# Patient Record
Sex: MALE | Race: WHITE | NOT HISPANIC OR LATINO | Employment: PART TIME | URBAN - NONMETROPOLITAN AREA
[De-identification: names, ages, dates, MRNs, and addresses within clinical notes are randomized per-mention and may not be internally consistent; named-entity substitution may affect disease eponyms.]

---

## 2023-11-09 DIAGNOSIS — E11.9 TYPE 2 DIABETES MELLITUS TREATED WITHOUT INSULIN (MULTI): Primary | ICD-10-CM

## 2023-11-12 RX ORDER — BLOOD-GLUCOSE TRANSMITTER
EACH MISCELLANEOUS
Qty: 1 EACH | Refills: 2 | Status: SHIPPED | OUTPATIENT
Start: 2023-11-12

## 2023-11-27 PROBLEM — I10 HIGH BLOOD PRESSURE: Status: ACTIVE | Noted: 2023-11-27

## 2023-11-27 PROBLEM — N52.9 ERECTILE DYSFUNCTION: Status: ACTIVE | Noted: 2023-11-27

## 2023-11-27 PROBLEM — H53.2 DIPLOPIA: Status: ACTIVE | Noted: 2023-11-27

## 2023-11-27 PROBLEM — H33.20 RETINAL DETACHMENT: Status: ACTIVE | Noted: 2023-11-27

## 2023-11-27 PROBLEM — N52.9 IMPOTENCE OF ORGANIC ORIGIN: Status: ACTIVE | Noted: 2023-11-27

## 2023-11-27 PROBLEM — E11.9 TYPE II DIABETES MELLITUS (MULTI): Status: ACTIVE | Noted: 2023-11-27

## 2023-11-27 PROBLEM — E11.3591: Status: ACTIVE | Noted: 2023-11-27

## 2023-11-27 PROBLEM — E78.00 HIGH CHOLESTEROL: Status: ACTIVE | Noted: 2023-11-27

## 2023-11-27 PROBLEM — F32.A DEPRESSION: Status: ACTIVE | Noted: 2023-11-27

## 2023-12-30 DIAGNOSIS — E11.9 TYPE 2 DIABETES MELLITUS TREATED WITHOUT INSULIN (MULTI): ICD-10-CM

## 2023-12-30 RX ORDER — INSULIN GLARGINE 100 [IU]/ML
100 INJECTION, SOLUTION SUBCUTANEOUS NIGHTLY
Qty: 30 ML | Refills: 0 | Status: SHIPPED | OUTPATIENT
Start: 2023-12-30 | End: 2024-01-29

## 2023-12-30 NOTE — PROGRESS NOTES
I spoke with him today ON CALL. Out of Lantus for a few days. States pharmacies did not have it. Found the [facility] on [address] does have it. I sent it in for him but reminded him that he missed his appt at the end for last month. States the weather was bad but I reminded him he needs to call to cancel these if he can't make it.   He will call in next week to scheduled appt. Virtual is okay. He'll do the lab tests I entered at a  lab before that visit. Can download the dexcom at that visit

## 2024-01-25 DIAGNOSIS — E11.9 TYPE 2 DIABETES MELLITUS TREATED WITHOUT INSULIN (MULTI): ICD-10-CM

## 2024-01-29 ENCOUNTER — OFFICE VISIT (OUTPATIENT)
Dept: PRIMARY CARE | Facility: CLINIC | Age: 59
End: 2024-01-29
Payer: COMMERCIAL

## 2024-01-29 VITALS
WEIGHT: 200 LBS | DIASTOLIC BLOOD PRESSURE: 70 MMHG | OXYGEN SATURATION: 89 % | HEART RATE: 64 BPM | SYSTOLIC BLOOD PRESSURE: 152 MMHG | BODY MASS INDEX: 30.41 KG/M2

## 2024-01-29 DIAGNOSIS — E11.3591: ICD-10-CM

## 2024-01-29 DIAGNOSIS — I10 PRIMARY HYPERTENSION: ICD-10-CM

## 2024-01-29 DIAGNOSIS — F32.89 OTHER DEPRESSION: ICD-10-CM

## 2024-01-29 DIAGNOSIS — Z79.4 TYPE 2 DIABETES MELLITUS WITH MILD NONPROLIFERATIVE RETINOPATHY WITHOUT MACULAR EDEMA, WITH LONG-TERM CURRENT USE OF INSULIN, UNSPECIFIED LATERALITY (MULTI): Primary | ICD-10-CM

## 2024-01-29 DIAGNOSIS — R09.02 HYPOXIA: ICD-10-CM

## 2024-01-29 DIAGNOSIS — Z12.11 ENCOUNTER FOR SCREENING FOR MALIGNANT NEOPLASM OF COLON: ICD-10-CM

## 2024-01-29 DIAGNOSIS — E11.3299 TYPE 2 DIABETES MELLITUS WITH MILD NONPROLIFERATIVE RETINOPATHY WITHOUT MACULAR EDEMA, WITH LONG-TERM CURRENT USE OF INSULIN, UNSPECIFIED LATERALITY (MULTI): Primary | ICD-10-CM

## 2024-01-29 PROBLEM — E11.319 TYPE 2 DIABETES MELLITUS WITH RETINOPATHY, WITH LONG-TERM CURRENT USE OF INSULIN (MULTI): Status: ACTIVE | Noted: 2023-11-27

## 2024-01-29 PROBLEM — E10.3523: Status: ACTIVE | Noted: 2018-07-17

## 2024-01-29 LAB — POC HEMOGLOBIN A1C: 5.4 % (ref 4.2–6.5)

## 2024-01-29 PROCEDURE — 3077F SYST BP >= 140 MM HG: CPT | Performed by: FAMILY MEDICINE

## 2024-01-29 PROCEDURE — 99214 OFFICE O/P EST MOD 30 MIN: CPT | Performed by: FAMILY MEDICINE

## 2024-01-29 PROCEDURE — 1036F TOBACCO NON-USER: CPT | Performed by: FAMILY MEDICINE

## 2024-01-29 PROCEDURE — 3078F DIAST BP <80 MM HG: CPT | Performed by: FAMILY MEDICINE

## 2024-01-29 RX ORDER — INSULIN GLARGINE 100 [IU]/ML
INJECTION, SOLUTION SUBCUTANEOUS
Qty: 30 ML | Refills: 0 | Status: SHIPPED | OUTPATIENT
Start: 2024-01-29 | End: 2024-01-30 | Stop reason: SDUPTHER

## 2024-01-29 ASSESSMENT — ENCOUNTER SYMPTOMS
PALPITATIONS: 0
NAUSEA: 0
OCCASIONAL FEELINGS OF UNSTEADINESS: 0
LOSS OF SENSATION IN FEET: 0
BLOOD IN STOOL: 0
DIARRHEA: 0
CHEST TIGHTNESS: 0
SHORTNESS OF BREATH: 0
ACTIVITY CHANGE: 0
HEADACHES: 0
DEPRESSION: 0
DIZZINESS: 0
CONSTIPATION: 0
COUGH: 0

## 2024-01-29 ASSESSMENT — PATIENT HEALTH QUESTIONNAIRE - PHQ9
1. LITTLE INTEREST OR PLEASURE IN DOING THINGS: NOT AT ALL
2. FEELING DOWN, DEPRESSED OR HOPELESS: NOT AT ALL
SUM OF ALL RESPONSES TO PHQ9 QUESTIONS 1 AND 2: 0

## 2024-01-29 ASSESSMENT — PAIN SCALES - GENERAL: PAINLEVEL: 0-NO PAIN

## 2024-01-29 NOTE — PATIENT INSTRUCTIONS
Your diabetes is very well controlled.  You blood oxygen is low. Let's get a chest xray to work that up.   Do your blood tests.  You're doing fine off wellbutrin, so we will continue to hold it.  The cologuard test will come in the mail

## 2024-01-29 NOTE — PROGRESS NOTES
Subjective   Patient ID: Kevin L Bosworth is a 58 y.o. male who presents for Diabetes.    He presents today for regular follow-up.  His hemoglobin A1c is excellent today.  He attributes that to the CGM which allows him to see what is going on with his blood sugars in real-time and has been able to adjust his diet.  He even gets some low sugars at times but he has been adjusting his insulin his own to help with this.    His pulse ox today is low for some reason.  He states he continues to have this tickly cough that has had for many many years now.  Back in 2020 he had a chest x-ray and CT angiogram which were negative for any lung issues.  He states overall he feels well.  Is any chest pain or shortness of breath.  He does not follow his blood pressure at home.    He stopped the Wellbutrin.  He has not noticed a difference and also would like to just remain off of that.    He has not had a colonoscopy.  He has had no family history of colon cancer.  He does not have any chronic constipation or diarrhea no blood in the stool.  He is hesitant to do a colonoscopy.         Review of Systems   Constitutional:  Negative for activity change.   Respiratory:  Negative for cough, chest tightness and shortness of breath.    Cardiovascular:  Negative for chest pain, palpitations and leg swelling.   Gastrointestinal:  Negative for blood in stool, constipation, diarrhea and nausea.   Neurological:  Negative for dizziness and headaches.       Objective   /70   Pulse 64   Wt 90.7 kg (200 lb)   SpO2 (!) 89%   BMI 30.41 kg/m²     Physical Exam  Constitutional:       Appearance: Normal appearance.   Neck:      Thyroid: No thyromegaly or thyroid tenderness.      Vascular: No carotid bruit.   Cardiovascular:      Rate and Rhythm: Normal rate and regular rhythm.      Heart sounds: No murmur heard.  Pulmonary:      Effort: Pulmonary effort is normal.      Breath sounds: Normal breath sounds.   Musculoskeletal:      Cervical  back: Neck supple.   Neurological:      Mental Status: He is alert.   Psychiatric:         Mood and Affect: Mood normal.         Assessment/Plan   Diagnoses and all orders for this visit:  Type 2 diabetes mellitus with mild nonproliferative retinopathy without macular edema, with long-term current use of insulin, unspecified laterality (CMS/[facility])  -     POCT glycosylated hemoglobin (Hb A1C) manually resulted  -     Lipid Panel; Future  -     Comprehensive Metabolic Panel; Future  -     Albumin , Urine Random; Future  Hypoxia  -     XR chest 2 views; Future  -     CBC and Auto Differential; Future  Proliferative diabetic retinopathy of right eye determined by examination (CMS/[facility])  Primary hypertension  Other depression  Encounter for screening for malignant neoplasm of colon  -     Cologuard® colon cancer screening; Future  Diabetes is well-controlled.  He will continue his current medications.    This blood oxygen is quite low we and we recheck a number of times.  Start with a chest x-ray and then figure out workup from there.  Will also get a CBC.    He can continue to be off the Wellbutrin since it really has made a difference and he is feeling okay.    He is agreeable to Cologuard and then a colonoscopy should that be positive.

## 2024-01-30 DIAGNOSIS — E11.9 TYPE 2 DIABETES MELLITUS TREATED WITHOUT INSULIN (MULTI): ICD-10-CM

## 2024-01-30 RX ORDER — INSULIN GLARGINE 100 [IU]/ML
100 INJECTION, SOLUTION SUBCUTANEOUS NIGHTLY
Qty: 90 ML | Refills: 1 | Status: SHIPPED | OUTPATIENT
Start: 2024-01-30 | End: 2024-07-28

## 2024-03-06 ENCOUNTER — APPOINTMENT (OUTPATIENT)
Dept: RADIOLOGY | Facility: HOSPITAL | Age: 59
End: 2024-03-06
Payer: COMMERCIAL

## 2024-03-06 ENCOUNTER — APPOINTMENT (OUTPATIENT)
Dept: CARDIOLOGY | Facility: HOSPITAL | Age: 59
End: 2024-03-06
Payer: COMMERCIAL

## 2024-03-06 ENCOUNTER — HOSPITAL ENCOUNTER (OUTPATIENT)
Dept: CARDIOLOGY | Facility: HOSPITAL | Age: 59
Discharge: HOME | End: 2024-03-06
Payer: COMMERCIAL

## 2024-03-06 ENCOUNTER — HOSPITAL ENCOUNTER (OUTPATIENT)
Facility: HOSPITAL | Age: 59
Setting detail: OBSERVATION
Discharge: HOME | End: 2024-03-07
Attending: EMERGENCY MEDICINE | Admitting: INTERNAL MEDICINE
Payer: COMMERCIAL

## 2024-03-06 DIAGNOSIS — I10 PRIMARY HYPERTENSION: ICD-10-CM

## 2024-03-06 DIAGNOSIS — R07.9 CHEST PAIN, UNSPECIFIED TYPE: Primary | ICD-10-CM

## 2024-03-06 LAB
ALBUMIN SERPL-MCNC: 3.9 G/DL (ref 3.5–5)
ALP BLD-CCNC: 96 U/L (ref 35–125)
ALT SERPL-CCNC: 36 U/L (ref 5–40)
ANION GAP SERPL CALC-SCNC: 11 MMOL/L
AST SERPL-CCNC: 19 U/L (ref 5–40)
BASOPHILS # BLD AUTO: 0.05 X10*3/UL (ref 0–0.1)
BASOPHILS NFR BLD AUTO: 0.7 %
BILIRUB SERPL-MCNC: 0.2 MG/DL (ref 0.1–1.2)
BUN SERPL-MCNC: 14 MG/DL (ref 8–25)
CALCIUM SERPL-MCNC: 8.8 MG/DL (ref 8.5–10.4)
CHLORIDE SERPL-SCNC: 101 MMOL/L (ref 97–107)
CO2 SERPL-SCNC: 25 MMOL/L (ref 24–31)
CREAT SERPL-MCNC: 1 MG/DL (ref 0.4–1.6)
EGFRCR SERPLBLD CKD-EPI 2021: 87 ML/MIN/1.73M*2
EOSINOPHIL # BLD AUTO: 0.13 X10*3/UL (ref 0–0.7)
EOSINOPHIL NFR BLD AUTO: 1.7 %
ERYTHROCYTE [DISTWIDTH] IN BLOOD BY AUTOMATED COUNT: 12 % (ref 11.5–14.5)
GLUCOSE SERPL-MCNC: 399 MG/DL (ref 65–99)
HCT VFR BLD AUTO: 46.2 % (ref 41–52)
HGB BLD-MCNC: 15.2 G/DL (ref 13.5–17.5)
IMM GRANULOCYTES # BLD AUTO: 0.01 X10*3/UL (ref 0–0.7)
IMM GRANULOCYTES NFR BLD AUTO: 0.1 % (ref 0–0.9)
LYMPHOCYTES # BLD AUTO: 2.13 X10*3/UL (ref 1.2–4.8)
LYMPHOCYTES NFR BLD AUTO: 28.4 %
MAGNESIUM SERPL-MCNC: 1.9 MG/DL (ref 1.6–3.1)
MCH RBC QN AUTO: 30.3 PG (ref 26–34)
MCHC RBC AUTO-ENTMCNC: 32.9 G/DL (ref 32–36)
MCV RBC AUTO: 92 FL (ref 80–100)
MONOCYTES # BLD AUTO: 0.87 X10*3/UL (ref 0.1–1)
MONOCYTES NFR BLD AUTO: 11.6 %
NEUTROPHILS # BLD AUTO: 4.32 X10*3/UL (ref 1.2–7.7)
NEUTROPHILS NFR BLD AUTO: 57.5 %
NRBC BLD-RTO: 0 /100 WBCS (ref 0–0)
PLATELET # BLD AUTO: 229 X10*3/UL (ref 150–450)
POTASSIUM SERPL-SCNC: 4.2 MMOL/L (ref 3.4–5.1)
PROT SERPL-MCNC: 6.5 G/DL (ref 5.9–7.9)
RBC # BLD AUTO: 5.02 X10*6/UL (ref 4.5–5.9)
SODIUM SERPL-SCNC: 137 MMOL/L (ref 133–145)
TROPONIN T SERPL-MCNC: 12 NG/L
WBC # BLD AUTO: 7.5 X10*3/UL (ref 4.4–11.3)

## 2024-03-06 PROCEDURE — 99222 1ST HOSP IP/OBS MODERATE 55: CPT | Performed by: NURSE PRACTITIONER

## 2024-03-06 PROCEDURE — 80053 COMPREHEN METABOLIC PANEL: CPT | Performed by: EMERGENCY MEDICINE

## 2024-03-06 PROCEDURE — 71045 X-RAY EXAM CHEST 1 VIEW: CPT | Performed by: RADIOLOGY

## 2024-03-06 PROCEDURE — 36415 COLL VENOUS BLD VENIPUNCTURE: CPT | Performed by: EMERGENCY MEDICINE

## 2024-03-06 PROCEDURE — G0378 HOSPITAL OBSERVATION PER HR: HCPCS

## 2024-03-06 PROCEDURE — 85025 COMPLETE CBC W/AUTO DIFF WBC: CPT | Performed by: EMERGENCY MEDICINE

## 2024-03-06 PROCEDURE — 83735 ASSAY OF MAGNESIUM: CPT | Performed by: EMERGENCY MEDICINE

## 2024-03-06 PROCEDURE — 99285 EMERGENCY DEPT VISIT HI MDM: CPT | Mod: 25

## 2024-03-06 PROCEDURE — 93005 ELECTROCARDIOGRAM TRACING: CPT | Mod: 59

## 2024-03-06 PROCEDURE — 84484 ASSAY OF TROPONIN QUANT: CPT | Performed by: EMERGENCY MEDICINE

## 2024-03-06 PROCEDURE — 93005 ELECTROCARDIOGRAM TRACING: CPT

## 2024-03-06 PROCEDURE — 71045 X-RAY EXAM CHEST 1 VIEW: CPT

## 2024-03-06 RX ORDER — DEXTROSE 50 % IN WATER (D50W) INTRAVENOUS SYRINGE
25
Status: DISCONTINUED | OUTPATIENT
Start: 2024-03-06 | End: 2024-03-07 | Stop reason: HOSPADM

## 2024-03-06 RX ORDER — INSULIN LISPRO 100 [IU]/ML
0-5 INJECTION, SOLUTION INTRAVENOUS; SUBCUTANEOUS
Status: DISCONTINUED | OUTPATIENT
Start: 2024-03-07 | End: 2024-03-07 | Stop reason: HOSPADM

## 2024-03-06 RX ORDER — ONDANSETRON HYDROCHLORIDE 2 MG/ML
4 INJECTION, SOLUTION INTRAVENOUS EVERY 6 HOURS PRN
Status: DISCONTINUED | OUTPATIENT
Start: 2024-03-06 | End: 2024-03-07 | Stop reason: HOSPADM

## 2024-03-06 RX ORDER — ACETAMINOPHEN 650 MG/1
650 SUPPOSITORY RECTAL EVERY 4 HOURS PRN
Status: DISCONTINUED | OUTPATIENT
Start: 2024-03-06 | End: 2024-03-07 | Stop reason: HOSPADM

## 2024-03-06 RX ORDER — ROSUVASTATIN CALCIUM 10 MG/1
10 TABLET, COATED ORAL NIGHTLY
Status: DISCONTINUED | OUTPATIENT
Start: 2024-03-06 | End: 2024-03-07 | Stop reason: HOSPADM

## 2024-03-06 RX ORDER — ACETAMINOPHEN 325 MG/1
650 TABLET ORAL EVERY 4 HOURS PRN
Status: DISCONTINUED | OUTPATIENT
Start: 2024-03-06 | End: 2024-03-07 | Stop reason: HOSPADM

## 2024-03-06 RX ORDER — INSULIN GLARGINE 100 [IU]/ML
50 INJECTION, SOLUTION SUBCUTANEOUS 2 TIMES DAILY
Status: DISCONTINUED | OUTPATIENT
Start: 2024-03-07 | End: 2024-03-07 | Stop reason: HOSPADM

## 2024-03-06 RX ORDER — DEXTROSE MONOHYDRATE 100 MG/ML
0.3 INJECTION, SOLUTION INTRAVENOUS ONCE AS NEEDED
Status: DISCONTINUED | OUTPATIENT
Start: 2024-03-06 | End: 2024-03-07 | Stop reason: HOSPADM

## 2024-03-06 RX ORDER — NAPROXEN SODIUM 220 MG/1
81 TABLET, FILM COATED ORAL DAILY
Status: DISCONTINUED | OUTPATIENT
Start: 2024-03-07 | End: 2024-03-07 | Stop reason: HOSPADM

## 2024-03-06 RX ORDER — ACETAMINOPHEN 160 MG/5ML
650 SOLUTION ORAL EVERY 4 HOURS PRN
Status: DISCONTINUED | OUTPATIENT
Start: 2024-03-06 | End: 2024-03-07 | Stop reason: HOSPADM

## 2024-03-06 RX ORDER — NITROGLYCERIN 0.4 MG/1
0.4 TABLET SUBLINGUAL EVERY 5 MIN PRN
Status: DISCONTINUED | OUTPATIENT
Start: 2024-03-06 | End: 2024-03-07 | Stop reason: HOSPADM

## 2024-03-06 ASSESSMENT — PAIN - FUNCTIONAL ASSESSMENT: PAIN_FUNCTIONAL_ASSESSMENT: 0-10

## 2024-03-06 ASSESSMENT — PAIN SCALES - GENERAL: PAINLEVEL_OUTOF10: 0 - NO PAIN

## 2024-03-06 ASSESSMENT — COLUMBIA-SUICIDE SEVERITY RATING SCALE - C-SSRS
1. IN THE PAST MONTH, HAVE YOU WISHED YOU WERE DEAD OR WISHED YOU COULD GO TO SLEEP AND NOT WAKE UP?: NO
6. HAVE YOU EVER DONE ANYTHING, STARTED TO DO ANYTHING, OR PREPARED TO DO ANYTHING TO END YOUR LIFE?: NO
2. HAVE YOU ACTUALLY HAD ANY THOUGHTS OF KILLING YOURSELF?: NO

## 2024-03-06 ASSESSMENT — HEART SCORE
HISTORY: MODERATELY SUSPICIOUS
RISK FACTORS: 1-2 RISK FACTORS
AGE: 45-64
ECG: NON-SPECIFIC REPOLARIZATION DISTURBANCE
HEART SCORE: 4
TROPONIN: LESS THAN OR EQUAL TO NORMAL LIMIT

## 2024-03-06 ASSESSMENT — PAIN DESCRIPTION - LOCATION: LOCATION: CHEST

## 2024-03-06 ASSESSMENT — PAIN DESCRIPTION - PROGRESSION: CLINICAL_PROGRESSION: NOT CHANGED

## 2024-03-07 ENCOUNTER — APPOINTMENT (OUTPATIENT)
Dept: CARDIOLOGY | Facility: HOSPITAL | Age: 59
End: 2024-03-07
Payer: COMMERCIAL

## 2024-03-07 ENCOUNTER — PHARMACY VISIT (OUTPATIENT)
Dept: PHARMACY | Facility: CLINIC | Age: 59
End: 2024-03-07
Payer: MEDICAID

## 2024-03-07 VITALS
BODY MASS INDEX: 30.31 KG/M2 | OXYGEN SATURATION: 98 % | WEIGHT: 200 LBS | RESPIRATION RATE: 16 BRPM | DIASTOLIC BLOOD PRESSURE: 85 MMHG | HEART RATE: 69 BPM | HEIGHT: 68 IN | SYSTOLIC BLOOD PRESSURE: 139 MMHG | TEMPERATURE: 98.3 F

## 2024-03-07 LAB
ALBUMIN SERPL-MCNC: 4 G/DL (ref 3.5–5)
ALBUMIN SERPL-MCNC: NORMAL G/DL
ALP BLD-CCNC: 100 U/L (ref 35–125)
ALP BLD-CCNC: NORMAL U/L
ALT SERPL-CCNC: 36 U/L (ref 5–40)
ALT SERPL-CCNC: NORMAL U/L
ANION GAP SERPL CALC-SCNC: 15 MMOL/L
ANION GAP SERPL CALC-SCNC: NORMAL MMOL/L
AST SERPL-CCNC: 20 U/L (ref 5–40)
AST SERPL-CCNC: NORMAL U/L
BASOPHILS # BLD AUTO: 0.05 X10*3/UL (ref 0–0.1)
BASOPHILS NFR BLD AUTO: 0.7 %
BILIRUB SERPL-MCNC: 0.3 MG/DL (ref 0.1–1.2)
BILIRUB SERPL-MCNC: NORMAL MG/DL
BUN SERPL-MCNC: 12 MG/DL (ref 8–25)
BUN SERPL-MCNC: NORMAL MG/DL
CALCIUM SERPL-MCNC: 9.3 MG/DL (ref 8.5–10.4)
CALCIUM SERPL-MCNC: NORMAL MG/DL
CHLORIDE SERPL-SCNC: 102 MMOL/L (ref 97–107)
CHLORIDE SERPL-SCNC: NORMAL MMOL/L
CO2 SERPL-SCNC: 24 MMOL/L (ref 24–31)
CO2 SERPL-SCNC: NORMAL MMOL/L
CREAT SERPL-MCNC: 1.1 MG/DL (ref 0.4–1.6)
CREAT SERPL-MCNC: NORMAL MG/DL
EGFRCR SERPLBLD CKD-EPI 2021: 78 ML/MIN/1.73M*2
EGFRCR SERPLBLD CKD-EPI 2021: NORMAL ML/MIN/{1.73_M2}
EOSINOPHIL # BLD AUTO: 0.17 X10*3/UL (ref 0–0.7)
EOSINOPHIL NFR BLD AUTO: 2.3 %
ERYTHROCYTE [DISTWIDTH] IN BLOOD BY AUTOMATED COUNT: 12 % (ref 11.5–14.5)
ERYTHROCYTE [DISTWIDTH] IN BLOOD BY AUTOMATED COUNT: NORMAL %
EST. AVERAGE GLUCOSE BLD GHB EST-MCNC: NORMAL MG/DL
GLUCOSE SERPL-MCNC: 106 MG/DL (ref 65–99)
GLUCOSE SERPL-MCNC: NORMAL MG/DL
HBA1C MFR BLD: NORMAL %
HCT VFR BLD AUTO: 45.1 % (ref 41–52)
HCT VFR BLD AUTO: NORMAL %
HGB BLD-MCNC: 15.2 G/DL (ref 13.5–17.5)
HGB BLD-MCNC: NORMAL G/DL
HOLD SPECIMEN: NORMAL
IMM GRANULOCYTES # BLD AUTO: 0.02 X10*3/UL (ref 0–0.7)
IMM GRANULOCYTES NFR BLD AUTO: 0.3 % (ref 0–0.9)
LYMPHOCYTES # BLD AUTO: 2.05 X10*3/UL (ref 1.2–4.8)
LYMPHOCYTES NFR BLD AUTO: 28.3 %
MCH RBC QN AUTO: 30 PG (ref 26–34)
MCH RBC QN AUTO: NORMAL PG
MCHC RBC AUTO-ENTMCNC: 33.7 G/DL (ref 32–36)
MCHC RBC AUTO-ENTMCNC: NORMAL G/DL
MCV RBC AUTO: 89 FL (ref 80–100)
MCV RBC AUTO: NORMAL FL
MONOCYTES # BLD AUTO: 0.95 X10*3/UL (ref 0.1–1)
MONOCYTES NFR BLD AUTO: 13.1 %
NEUTROPHILS # BLD AUTO: 4 X10*3/UL (ref 1.2–7.7)
NEUTROPHILS NFR BLD AUTO: 55.3 %
NRBC BLD-RTO: 0 /100 WBCS (ref 0–0)
NRBC BLD-RTO: NORMAL /100{WBCS}
PLATELET # BLD AUTO: 244 X10*3/UL (ref 150–450)
PLATELET # BLD AUTO: NORMAL 10*3/UL
POTASSIUM SERPL-SCNC: 4.1 MMOL/L (ref 3.4–5.1)
POTASSIUM SERPL-SCNC: NORMAL MMOL/L
PROT SERPL-MCNC: 6.5 G/DL (ref 5.9–7.9)
PROT SERPL-MCNC: NORMAL G/DL
RBC # BLD AUTO: 5.06 X10*6/UL (ref 4.5–5.9)
RBC # BLD AUTO: NORMAL 10*6/UL
RBC MORPH BLD: NORMAL
SODIUM SERPL-SCNC: 141 MMOL/L (ref 133–145)
SODIUM SERPL-SCNC: NORMAL MMOL/L
TROPONIN T SERPL-MCNC: 19 NG/L
TROPONIN T SERPL-MCNC: NORMAL
TSH SERPL DL<=0.05 MIU/L-ACNC: NORMAL M[IU]/L
WBC # BLD AUTO: 7.2 X10*3/UL (ref 4.4–11.3)
WBC # BLD AUTO: NORMAL 10*3/UL

## 2024-03-07 PROCEDURE — 93005 ELECTROCARDIOGRAM TRACING: CPT

## 2024-03-07 PROCEDURE — 36415 COLL VENOUS BLD VENIPUNCTURE: CPT | Performed by: EMERGENCY MEDICINE

## 2024-03-07 PROCEDURE — 2500000002 HC RX 250 W HCPCS SELF ADMINISTERED DRUGS (ALT 637 FOR MEDICARE OP, ALT 636 FOR OP/ED): Performed by: NURSE PRACTITIONER

## 2024-03-07 PROCEDURE — 99232 SBSQ HOSP IP/OBS MODERATE 35: CPT | Performed by: NURSE PRACTITIONER

## 2024-03-07 PROCEDURE — 80053 COMPREHEN METABOLIC PANEL: CPT | Performed by: NURSE PRACTITIONER

## 2024-03-07 PROCEDURE — 2500000001 HC RX 250 WO HCPCS SELF ADMINISTERED DRUGS (ALT 637 FOR MEDICARE OP): Performed by: NURSE PRACTITIONER

## 2024-03-07 PROCEDURE — 85025 COMPLETE CBC W/AUTO DIFF WBC: CPT | Performed by: NURSE PRACTITIONER

## 2024-03-07 PROCEDURE — 83036 HEMOGLOBIN GLYCOSYLATED A1C: CPT | Performed by: NURSE PRACTITIONER

## 2024-03-07 PROCEDURE — 85027 COMPLETE CBC AUTOMATED: CPT | Mod: 59 | Performed by: NURSE PRACTITIONER

## 2024-03-07 PROCEDURE — 84443 ASSAY THYROID STIM HORMONE: CPT | Performed by: NURSE PRACTITIONER

## 2024-03-07 PROCEDURE — 84484 ASSAY OF TROPONIN QUANT: CPT | Performed by: EMERGENCY MEDICINE

## 2024-03-07 PROCEDURE — 93005 ELECTROCARDIOGRAM TRACING: CPT | Mod: 59

## 2024-03-07 PROCEDURE — RXMED WILLOW AMBULATORY MEDICATION CHARGE

## 2024-03-07 PROCEDURE — G0378 HOSPITAL OBSERVATION PER HR: HCPCS

## 2024-03-07 PROCEDURE — 36415 COLL VENOUS BLD VENIPUNCTURE: CPT | Performed by: NURSE PRACTITIONER

## 2024-03-07 PROCEDURE — 84075 ASSAY ALKALINE PHOSPHATASE: CPT | Performed by: NURSE PRACTITIONER

## 2024-03-07 RX ORDER — NAPROXEN SODIUM 220 MG/1
81 TABLET, FILM COATED ORAL DAILY
Qty: 30 TABLET | Refills: 0 | Status: SHIPPED | OUTPATIENT
Start: 2024-03-08 | End: 2024-04-09 | Stop reason: HOSPADM

## 2024-03-07 RX ORDER — IBUPROFEN 200 MG
800 TABLET ORAL EVERY 6 HOURS PRN
COMMUNITY
End: 2024-04-09 | Stop reason: HOSPADM

## 2024-03-07 RX ORDER — LOSARTAN POTASSIUM 25 MG/1
25 TABLET ORAL DAILY
Qty: 30 TABLET | Refills: 0 | Status: SHIPPED | OUTPATIENT
Start: 2024-03-08 | End: 2024-05-20 | Stop reason: SDUPTHER

## 2024-03-07 RX ORDER — LOSARTAN POTASSIUM 50 MG/1
25 TABLET ORAL DAILY
Status: DISCONTINUED | OUTPATIENT
Start: 2024-03-07 | End: 2024-03-07 | Stop reason: HOSPADM

## 2024-03-07 RX ADMIN — INSULIN GLARGINE 50 UNITS: 100 INJECTION, SOLUTION SUBCUTANEOUS at 09:23

## 2024-03-07 RX ADMIN — ASPIRIN 81 MG CHEWABLE TABLET 81 MG: 81 TABLET CHEWABLE at 09:16

## 2024-03-07 RX ADMIN — LOSARTAN POTASSIUM 25 MG: 50 TABLET, FILM COATED ORAL at 09:17

## 2024-03-07 ASSESSMENT — PAIN - FUNCTIONAL ASSESSMENT: PAIN_FUNCTIONAL_ASSESSMENT: 0-10

## 2024-03-07 NOTE — ED PROVIDER NOTES
EMERGENCY DEPARTMENT ENCOUNTER      Pt Name: Kevin L Bosworth  MRN: [id number]  Birthdate 1965  Date of evaluation: 3/6/2024  ED Provider: Katt Cornleius DO     CHIEF COMPLAINT       Chief Complaint   Patient presents with    Chest Pain     Center chest pain started an hour ago        HISTORY OF PRESENT ILLNESS    Kevin L Bosworth is a 58 y.o. who presents to the emergency department via EMS with a complaint of chest pain.  He has a history of longstanding type 1 diabetes that is well-controlled with a last hemoglobin A1c of 5.4.  He states that earlier today he had an episode of substernal chest pressure.  He rates it a 4-5 out of 10.  He was given 1 nitroglycerin by EMS that improved his symptoms to a 2 3.  He had some associated lightheadedness.  There is a family history of hypertension but no history of CAD.  He himself believes he has hypertension but has never been formally diagnosed and is on the medication for this.  No history of hyperlipidemia or CAD in himself.  He does not smoke.    Nursing Notes were reviewed.    Outside historians: Family  REVIEW OF SYSTEMS     Focused ROS performed and negative other than as listed in HPI    PAST MEDICAL HISTORY     Past Medical History:   Diagnosis Date    Personal history of other diseases of the circulatory system     History of hypertension    Personal history of other endocrine, nutritional and metabolic disease     History of diabetes mellitus    Personal history of other endocrine, nutritional and metabolic disease     History of diabetic retinopathy       SURGICAL HISTORY       Past Surgical History:   Procedure Laterality Date    APPENDECTOMY  07/14/2016    Appendectomy    CATARACT EXTRACTION  07/14/2016    Cataract Surgery    OTHER SURGICAL HISTORY  07/14/2016    Laser Suite Retina Laser Treatment       CURRENT MEDICATIONS       Previous Medications    BLOOD-GLUCOSE SENSOR (DEXCOM G6 SENSOR) DEVICE    APPLY NEW SENSOR ONCE EVERY 10 DAYS FOR CONTINUOUS  GLUCOSE MONITORING 30 DAYS    DEXCOM G4 PLATINUM TRANSMITTER (DEXCOM G6 TRANSMITTER) DEVICE    INSERT INTO SENSOR EVERY 10 DAYS REPLACE ONCE EVERY 90 DAYS 30 DAYS    GLUCAGON (GVOKE HYPOPEN 2-PACK) 1 MG/0.2 ML AUTO-INJECTOR    Inject as directed for severe low blood sugar. Repeat in 15 minutes if needed. Subcutaneous for 30 days    GLUCOSAMINE SULFATE 500 MG CAPSULE    Take by mouth.    INSULIN GLARGINE (LANTUS U-100 INSULIN) 100 UNIT/ML INJECTION    Inject 100 Units under the skin once daily at bedtime. Take as directed per insulin instructions.    INSULIN LISPRO (HUMALOG KWIKPEN INSULIN) 100 UNIT/ML INJECTION    20 UNITS SUBCUTANEOUS THREE TIMES A DAY WITH MEAL 90 DAYS    ROSUVASTATIN (CRESTOR) 10 MG TABLET    once every 24 hours.       ALLERGIES     Dye    FAMILY HISTORY     No family history on file.     SOCIAL HISTORY       Social History     Socioeconomic History    Marital status: Single     Spouse name: None    Number of children: None    Years of education: None    Highest education level: None   Occupational History    Occupation: Investigator   Tobacco Use    Smoking status: Never    Smokeless tobacco: Never   Vaping Use    Vaping Use: Never used   Substance and Sexual Activity    Alcohol use: Never    Drug use: Never    Sexual activity: None   Other Topics Concern    None   Social History Narrative    None     Social Determinants of Health     Financial Resource Strain: Not on file   Food Insecurity: Not on file   Transportation Needs: Not on file   Physical Activity: Not on file   Stress: Not on file   Social Connections: Not on file   Intimate Partner Violence: Not on file   Housing Stability: Not on file       PHYSICAL EXAM       ED Triage Vitals [03/06/24 2100]   Temperature Heart Rate Respirations BP   36.8 °C (98.3 °F) 78 18 169/83      Pulse Ox Temp Source Heart Rate Source Patient Position   96 % Oral -- --      BP Location FiO2 (%)     -- --        General: Appears well, no acute distress,  alert  Head: Head atraumatic; normocephalic  Eyes: normal inspection; no icterus  ENT: mucosa moist without lesion  Neck: Normal inspection, no meningeal signs  Resp: Normal breath sounds, no wheeze or crackles; No respiratory distress  Chest Wall: no tenderness or deformity  Heart: Heart rate and rhythm regular; No Murmurs  Abdomen: Soft, Non-tender; No distention, guarding, rigidity, or rebound  MSK: Normal appearance; Moves all extremities; No Pedal edema  Neuro: Alert; no focal deficits, moves all extremities  Psych: Mood and Affect normal  Skin: Color appropriate; warm; Dry    DIAGNOSTIC RESULTS   Lab and radiology results are independently interpreted unless noted below.  RADIOLOGY (Per Emergency Physician):     Interpretation per the Radiologist below, if available at the time of this note:  XR chest 1 view   Final Result   No acute cardiopulmonary process.        MACRO:   None        Signed by: Ashly Thao 3/6/2024 10:01 PM   Dictation workstation:   [id number]          LABS:  Abnormal Labs Reviewed   COMPREHENSIVE METABOLIC PANEL - Abnormal; Notable for the following components:       Result Value    Glucose 399 (*)     All other components within normal limits       All other labs were within normal range or not returned as of this dictation.    EKG:  Personally interpreted by Katt Cornelius DO  2100: Sinus rhythm with occasional PAC ventricular rate 81 normal axis and intervals T wave flattening in lateral leads no acute ischemic changes    EMERGENCY DEPARTMENT COURSE/MDM:   Patient presents with a complaint of chest pain that has resolved at the time of evaluation.  Will undergo a cardiac workup.  Aspirin was provided by EMS as well.    Lab workup returned showing no specific acute abnormalities.  First troponin is negative.  Case is discussed with the nurse practitioner on-call who accept the patient for admission.  Patient is agreeable with plan of admission for cardiology evaluation.  He is  admitted in stable condition.    Heart Score: HEART Score: 4      Diagnoses as of 03/06/24 2246   Chest pain, unspecified type     Meds Administered:  Medications   aspirin chewable tablet 81 mg (has no administration in time range)   acetaminophen (Tylenol) tablet 650 mg (has no administration in time range)     Or   acetaminophen (Tylenol) oral liquid 650 mg (has no administration in time range)     Or   acetaminophen (Tylenol) suppository 650 mg (has no administration in time range)   oxygen (O2) therapy (has no administration in time range)   ondansetron (Zofran) injection 4 mg (has no administration in time range)   nitroglycerin (Nitrostat) SL tablet 0.4 mg (has no administration in time range)   rosuvastatin (Crestor) tablet 10 mg (has no administration in time range)   insulin glargine (Lantus) injection 50 Units (has no administration in time range)   dextrose 50 % injection 25 g (has no administration in time range)   glucagon (Glucagen) injection 1 mg (has no administration in time range)   dextrose 10 % in water (D10W) infusion (has no administration in time range)   insulin lispro (HumaLOG) injection 0-5 Units (has no administration in time range)     PROCEDURES:  Unless otherwise noted below, none  Procedures      FINAL IMPRESSION      1. Chest pain, unspecified type          DISPOSITION    Admit 03/06/2024 09:59:01 PM        (Comment: Please note this report has been produced using speech recognition software and may contain errors related to that system including errors in grammar, punctuation, and spelling, as well as words and phrases that may be inappropriate.  If there are any questions or concerns please feel free to contact the dictating provider for clarification.)    Katt Cornelius DO (electronically signed)  Emergency Medicine Physician    History Limited by: None  Independent history obtained from: Family Member siser  External records reviewed: None  Diagnostics interpreted by me: EKG -  see my independent interpretation elsewhere in the chart  Discussions with other clinicians:  observation NP  Chronic conditions impacting care: Diabetes  Social determinants of health affecting care: None  Diagnostic tests considered but not performed: n/a  ED Medications managed:  Medications   aspirin chewable tablet 81 mg (has no administration in time range)   acetaminophen (Tylenol) tablet 650 mg (has no administration in time range)     Or   acetaminophen (Tylenol) oral liquid 650 mg (has no administration in time range)     Or   acetaminophen (Tylenol) suppository 650 mg (has no administration in time range)   oxygen (O2) therapy (has no administration in time range)   ondansetron (Zofran) injection 4 mg (has no administration in time range)   nitroglycerin (Nitrostat) SL tablet 0.4 mg (has no administration in time range)   rosuvastatin (Crestor) tablet 10 mg (has no administration in time range)   insulin glargine (Lantus) injection 50 Units (has no administration in time range)   dextrose 50 % injection 25 g (has no administration in time range)   glucagon (Glucagen) injection 1 mg (has no administration in time range)   dextrose 10 % in water (D10W) infusion (has no administration in time range)   insulin lispro (HumaLOG) injection 0-5 Units (has no administration in time range)       Prescription drugs considered: None             Katt Cornelius DO  03/06/24 4186

## 2024-03-07 NOTE — ED NOTES
Patient is requesting to take all meds/insulin once he is able to eat.   Dr. Farnsworth just put in diet order.        Shira Horner, STEFANIA  03/07/24 0871

## 2024-03-07 NOTE — H&P
History Of Present Illness  Kevin L Bosworth is a 58 y.o. male PMH: Primary HTN, Diabetes Type I, Diabetic retinopathy of rt eye, depression, presenting with chest pain. Patient endorses one episode of mid-sternal chest pain 4/10 with no radiation of symptoms that started today (03/06/24) around 6pm with resolution of symptoms after EMS arrival and Nitroglycerin administration by EMS. Endorses shortness of breath with episode. No diaphoresis, n/v, diarrhea, fever, or chills noted. Patient states that symptoms of chest pain have since resolved. VSS on RA. No known history of ACS or CAD. No history of PE. Patient endorses family hx of HTN. No recent hospitalizations, no recent illness, no known sick contacts. Patient reports an ongoing cough for past year, nonproductive.  PCP-  No Cardiologist     *Allergy to DYE, patient endorses last stress test had severe reaction. Unable to locate previous testing order*    *Patient self glucose monitor reading error above 400 prior to arrival to ED.    ED Course;  EKG on arrival:Sinus rhythm with occasional PAC ventricular rate 81 normal axis and intervals T wave flattening in lateral leads no acute ischemic changes    CXR:No acute cardiopulmonary process.   High Sensitivity Troponin's; 12,  Glucose: 399  Heart Score: 4    Past Medical History  Past Medical History:   Diagnosis Date    Personal history of other diseases of the circulatory system     History of hypertension    Personal history of other endocrine, nutritional and metabolic disease     History of diabetes mellitus    Personal history of other endocrine, nutritional and metabolic disease     History of diabetic retinopathy       Surgical History  Past Surgical History:   Procedure Laterality Date    APPENDECTOMY  07/14/2016    Appendectomy    CATARACT EXTRACTION  07/14/2016    Cataract Surgery    OTHER SURGICAL HISTORY  07/14/2016    Laser Suite Retina Laser Treatment        Social History  He reports that  "he has never smoked. He has never used smokeless tobacco. He reports that he does not drink alcohol and does not use drugs.    Family History  No family history on file.     Allergies  Dye    Review of Systems     Physical Exam  Vitals and nursing note reviewed.   Constitutional:       Appearance: Normal appearance.   HENT:      Head: Normocephalic and atraumatic.      Nose: Nose normal.      Mouth/Throat:      Mouth: Mucous membranes are moist.   Eyes:      Pupils: Pupils are equal, round, and reactive to light.   Cardiovascular:      Rate and Rhythm: Normal rate and regular rhythm.   Pulmonary:      Effort: Pulmonary effort is normal.   Abdominal:      General: Abdomen is flat.   Genitourinary:     Comments: deferred  Musculoskeletal:      Cervical back: Normal range of motion and neck supple.   Skin:     General: Skin is warm.      Capillary Refill: Capillary refill takes less than 2 seconds.   Neurological:      General: No focal deficit present.      Mental Status: He is alert and oriented to person, place, and time. Mental status is at baseline.   Psychiatric:         Mood and Affect: Mood normal.         Behavior: Behavior normal.         Thought Content: Thought content normal.         Judgment: Judgment normal.          Last Recorded Vitals  Blood pressure 169/83, pulse 78, temperature 36.8 °C (98.3 °F), temperature source Oral, resp. rate 18, height 1.727 m (5' 8\"), weight 90.7 kg (200 lb), SpO2 96 %.    Relevant Results  Results for orders placed or performed during the hospital encounter of 03/06/24 (from the past 24 hour(s))   CBC and Auto Differential   Result Value Ref Range    WBC 7.5 4.4 - 11.3 x10*3/uL    nRBC 0.0 0.0 - 0.0 /100 WBCs    RBC 5.02 4.50 - 5.90 x10*6/uL    Hemoglobin 15.2 13.5 - 17.5 g/dL    Hematocrit 46.2 41.0 - 52.0 %    MCV 92 80 - 100 fL    MCH 30.3 26.0 - 34.0 pg    MCHC 32.9 32.0 - 36.0 g/dL    RDW 12.0 11.5 - 14.5 %    Platelets 229 150 - 450 x10*3/uL    Neutrophils % 57.5 40.0 " - 80.0 %    Immature Granulocytes %, Automated 0.1 0.0 - 0.9 %    Lymphocytes % 28.4 13.0 - 44.0 %    Monocytes % 11.6 2.0 - 10.0 %    Eosinophils % 1.7 0.0 - 6.0 %    Basophils % 0.7 0.0 - 2.0 %    Neutrophils Absolute 4.32 1.20 - 7.70 x10*3/uL    Immature Granulocytes Absolute, Automated 0.01 0.00 - 0.70 x10*3/uL    Lymphocytes Absolute 2.13 1.20 - 4.80 x10*3/uL    Monocytes Absolute 0.87 0.10 - 1.00 x10*3/uL    Eosinophils Absolute 0.13 0.00 - 0.70 x10*3/uL    Basophils Absolute 0.05 0.00 - 0.10 x10*3/uL   Comprehensive Metabolic Panel   Result Value Ref Range    Glucose 399 (H) 65 - 99 mg/dL    Sodium 137 133 - 145 mmol/L    Potassium 4.2 3.4 - 5.1 mmol/L    Chloride 101 97 - 107 mmol/L    Bicarbonate 25 24 - 31 mmol/L    Urea Nitrogen 14 8 - 25 mg/dL    Creatinine 1.00 0.40 - 1.60 mg/dL    eGFR 87 >60 mL/min/1.73m*2    Calcium 8.8 8.5 - 10.4 mg/dL    Albumin 3.9 3.5 - 5.0 g/dL    Alkaline Phosphatase 96 35 - 125 U/L    Total Protein 6.5 5.9 - 7.9 g/dL    AST 19 5 - 40 U/L    Bilirubin, Total 0.2 0.1 - 1.2 mg/dL    ALT 36 5 - 40 U/L    Anion Gap 11 <=19 mmol/L   Magnesium   Result Value Ref Range    Magnesium 1.90 1.60 - 3.10 mg/dL   Serial Troponin, Initial ([facility])   Result Value Ref Range    Troponin T, High Sensitivity 12 <=14 ng/L        Assessment/Plan   Principal Problem:    Chest pain  Chest Pain  -Trend High Sensitivity Troponin's: 12,  -Telemetry  -EKG PRN  -O2 PRN  -Nitroglycerin PRN  -NPO at midnight    2. DM I  -Accuchecks per protocol  -SSI  -Home dosages:  On Lispro TID PRN SS  On Lantus 50U BID     Secondary Problems:    3. HTN  -Vitals q4h      4. DVT Prophylaxis  -SCDS     Overall Plan/Impression:  03/06/24 @ 2246 : Admit to CDU/OBS . Pending bed assignment. Plan-Trend High Sensitivity Troponin's, overnight Telemetry, and monitoring of glucose. NPO at midnight if further cardiac testing warranted in am for risk stratification.    I spent 40 minutes in the professional and overall care of this  patient.      Veronika Hair, APRN-CNP

## 2024-03-07 NOTE — PROGRESS NOTES
"Kevin L Bosworth is a 58 y.o. male on day 0 of admission presenting with Chest pain.    Subjective   Patient resting in bed. Denies any chest pain, SOB, GI upset, fevers, or chills overnight. His chest pain had resolved once he arrived in ED. He had been given nitroglycerin by EMS. States he is feeling good this AM. Discussed plan of care.       Objective     Physical Exam  Constitutional:       General: He is not in acute distress.     Appearance: Normal appearance. He is not toxic-appearing.   HENT:      Head: Normocephalic.      Nose: Nose normal.      Mouth/Throat:      Mouth: Mucous membranes are moist.   Eyes:      Extraocular Movements: Extraocular movements intact.   Cardiovascular:      Rate and Rhythm: Normal rate and regular rhythm.      Pulses: Normal pulses.      Heart sounds: Normal heart sounds. No murmur heard.     No friction rub. No gallop.   Pulmonary:      Effort: Pulmonary effort is normal. No respiratory distress.      Breath sounds: Normal breath sounds. No wheezing, rhonchi or rales.   Abdominal:      General: Abdomen is flat.      Palpations: Abdomen is soft. There is no mass.      Tenderness: There is no abdominal tenderness. There is no guarding.   Musculoskeletal:         General: No signs of injury. Normal range of motion.      Right lower leg: No edema.      Left lower leg: No edema.   Skin:     General: Skin is warm.      Capillary Refill: Capillary refill takes less than 2 seconds.      Findings: No erythema or rash.   Neurological:      General: No focal deficit present.      Mental Status: He is alert and oriented to person, place, and time.   Psychiatric:         Mood and Affect: Mood normal.         Behavior: Behavior normal.         Last Recorded Vitals  Blood pressure (!) 168/98, pulse 64, temperature 36.8 °C (98.3 °F), temperature source Oral, resp. rate 18, height 1.727 m (5' 8\"), weight 90.7 kg (200 lb), SpO2 95 %.  Intake/Output last 3 Shifts:  No intake/output data " recorded.    Relevant Results              Results for orders placed or performed during the hospital encounter of 03/06/24 (from the past 24 hour(s))   CBC and Auto Differential   Result Value Ref Range    WBC 7.5 4.4 - 11.3 x10*3/uL    nRBC 0.0 0.0 - 0.0 /100 WBCs    RBC 5.02 4.50 - 5.90 x10*6/uL    Hemoglobin 15.2 13.5 - 17.5 g/dL    Hematocrit 46.2 41.0 - 52.0 %    MCV 92 80 - 100 fL    MCH 30.3 26.0 - 34.0 pg    MCHC 32.9 32.0 - 36.0 g/dL    RDW 12.0 11.5 - 14.5 %    Platelets 229 150 - 450 x10*3/uL    Neutrophils % 57.5 40.0 - 80.0 %    Immature Granulocytes %, Automated 0.1 0.0 - 0.9 %    Lymphocytes % 28.4 13.0 - 44.0 %    Monocytes % 11.6 2.0 - 10.0 %    Eosinophils % 1.7 0.0 - 6.0 %    Basophils % 0.7 0.0 - 2.0 %    Neutrophils Absolute 4.32 1.20 - 7.70 x10*3/uL    Immature Granulocytes Absolute, Automated 0.01 0.00 - 0.70 x10*3/uL    Lymphocytes Absolute 2.13 1.20 - 4.80 x10*3/uL    Monocytes Absolute 0.87 0.10 - 1.00 x10*3/uL    Eosinophils Absolute 0.13 0.00 - 0.70 x10*3/uL    Basophils Absolute 0.05 0.00 - 0.10 x10*3/uL   Comprehensive Metabolic Panel   Result Value Ref Range    Glucose 399 (H) 65 - 99 mg/dL    Sodium 137 133 - 145 mmol/L    Potassium 4.2 3.4 - 5.1 mmol/L    Chloride 101 97 - 107 mmol/L    Bicarbonate 25 24 - 31 mmol/L    Urea Nitrogen 14 8 - 25 mg/dL    Creatinine 1.00 0.40 - 1.60 mg/dL    eGFR 87 >60 mL/min/1.73m*2    Calcium 8.8 8.5 - 10.4 mg/dL    Albumin 3.9 3.5 - 5.0 g/dL    Alkaline Phosphatase 96 35 - 125 U/L    Total Protein 6.5 5.9 - 7.9 g/dL    AST 19 5 - 40 U/L    Bilirubin, Total 0.2 0.1 - 1.2 mg/dL    ALT 36 5 - 40 U/L    Anion Gap 11 <=19 mmol/L   Magnesium   Result Value Ref Range    Magnesium 1.90 1.60 - 3.10 mg/dL   Serial Troponin, Initial ([facility])   Result Value Ref Range    Troponin T, High Sensitivity 12 <=14 ng/L   Serial Troponin, 2 Hour ([facility])   Result Value Ref Range    Troponin T, High Sensitivity 12 <=14 ng/L   CBC   Result Value Ref Range    WBC 4.1 (L) 4.4 -  11.3 x10*3/uL    nRBC 0.0 0.0 - 0.0 /100 WBCs    RBC 2.78 (L) 4.50 - 5.90 x10*6/uL    Hemoglobin 8.4 (L) 13.5 - 17.5 g/dL    Hematocrit 27.3 (L) 41.0 - 52.0 %    MCV 98 80 - 100 fL    MCH 30.2 26.0 - 34.0 pg    MCHC 30.8 (L) 32.0 - 36.0 g/dL    RDW 11.9 11.5 - 14.5 %    Platelets 137 (L) 150 - 450 x10*3/uL   Comprehensive metabolic panel   Result Value Ref Range    Glucose 209 (H) 65 - 99 mg/dL    Sodium 142 133 - 145 mmol/L    Potassium 3.1 (L) 3.4 - 5.1 mmol/L    Chloride 111 (H) 97 - 107 mmol/L    Bicarbonate 24 24 - 31 mmol/L    Urea Nitrogen 10 8 - 25 mg/dL    Creatinine 0.80 0.40 - 1.60 mg/dL    eGFR >90 >60 mL/min/1.73m*2    Calcium 6.5 (L) 8.5 - 10.4 mg/dL    Albumin 2.9 (L) 3.5 - 5.0 g/dL    Alkaline Phosphatase 66 35 - 125 U/L    Total Protein 4.6 (L) 5.9 - 7.9 g/dL    AST 14 5 - 40 U/L    Bilirubin, Total <0.2 0.1 - 1.2 mg/dL    ALT 24 5 - 40 U/L    Anion Gap 7 <=19 mmol/L   Hemoglobin A1C   Result Value Ref Range    Hemoglobin A1C 7.2 (H) See below %    Estimated Average Glucose 160 Not Established mg/dL   Thyroid Stimulating Hormone   Result Value Ref Range    Thyroid Stimulating Hormone 1.65 0.27 - 4.20 mIU/L   Lavender Top   Result Value Ref Range    Extra Tube Hold for add-ons.    Serial Troponin, 6 Hour ([facility])   Result Value Ref Range    Troponin T, High Sensitivity 19 (HH) <=14 ng/L             Assessment/Plan   Principal Problem:    Chest pain    Chest Pain  -Trend High Sensitivity Troponin's: 12,12, 19  -Telemetry - NSR  -EKG PRN  -O2 PRN  -Nitroglycerin PRN  -Outpatient cardiology appt for further work up in 1-2 weeks     2. DM I  -Accuchecks per protocol  -SSI  -Home dosages:  On Lispro TID PRN SS  On Lantus 50U BID   -A1c 7.2  -FU with PCP     3. HTN  -BP elevated   -Start Losartan 25mg PO every day     4. Lab Abnormalities  -CBC and BMP from 0200 with drastic changes from previous. Suspect labs were drawn near IV site with fluid running. Patient states they were drawn from his L AC, where is  IV site is. Will recheck both.     Plan for DC this AM if rechecked labs are WNL and BP improves with addition of Losartan. Discussed with collaborating physician Dr. Farnsworth.      I spent 20 minutes in the professional and overall care of this patient.      Raina Ramírez, HERMILA-CNP

## 2024-03-07 NOTE — DISCHARGE SUMMARY
Discharge Diagnosis  Chest pain    Issues Requiring Follow-Up  None    Test Results Pending At Discharge  Pending Labs       Order Current Status    CBC and Auto Differential In process    CBC Edited            Hospital Course   Kevin L Bosworth is a 58 y.o. male PMH: Primary HTN, Diabetes Type I, Diabetic retinopathy of rt eye, depression, presenting with chest pain. Patient endorses one episode of mid-sternal chest pain 4/10 with no radiation of symptoms that started today (03/06/24) around 6pm with resolution of symptoms after EMS arrival and Nitroglycerin administration by EMS. Endorses shortness of breath with episode. No diaphoresis, n/v, diarrhea, fever, or chills noted. Patient states that symptoms of chest pain have since resolved. VSS on RA. No known history of ACS or CAD. No history of PE. Patient endorses family hx of HTN. No recent hospitalizations, no recent illness, no known sick contacts. Patient reports an ongoing cough for past year, nonproductive.     ED Course:  EKG on arrival: Sinus rhythm with occasional PAC ventricular rate 81 normal axis and intervals T wave flattening in lateral leads no acute ischemic changes    CXR: No acute cardiopulmonary process.   High Sensitivity Troponin's: 12, 12, 19  Glucose: 399  Heart Score: 4    Hospital Course:   He was admitted for overnight observation. Flat troponin trend. No reoccurrence of symptoms overnight. No events overnight. He was started on Losartan 25mg PO daily for elevated blood pressures. He is stable for discharge with outpatient follow up with cardiology in 1-2 weeks for further evaluation. He will also follow up with his PCP, Dr. England. Discussed with collaborating physician, Dr. Farnsworth.    Home Medications     Medication List      CONTINUE taking these medications     Dexcom G6 Sensor device; Generic drug: blood-glucose sensor; APPLY NEW   SENSOR ONCE EVERY 10 DAYS FOR CONTINUOUS GLUCOSE MONITORING 30 DAYS   Dexcom G6 Transmitter device;  Generic drug: blood-glucose transmitter   device; INSERT INTO SENSOR EVERY 10 DAYS REPLACE ONCE EVERY 90 DAYS 30   DAYS     ASK your doctor about these medications     glucosamine sulfate 500 mg capsule   Gvoke HypoPen 2-Pack 1 mg/0.2 mL auto-injector; Generic drug: glucagon   HumaLOG KwikPen Insulin 100 unit/mL injection; Generic drug: insulin   lispro; 20 UNITS SUBCUTANEOUS THREE TIMES A DAY WITH MEAL 90 DAYS   Lantus U-100 Insulin 100 unit/mL injection; Generic drug: insulin   glargine; Inject 100 Units under the skin once daily at bedtime. Take as   directed per insulin instructions.   rosuvastatin 10 mg tablet; Commonly known as: Nataliiaor       Raina Ramírez, APRN-CNP

## 2024-03-07 NOTE — PROGRESS NOTES
"Pharmacy Medication History Review    Kevin L Bosworth is a 58 y.o. male admitted for Chest pain. Pharmacy reviewed the patient's [id number] medications and allergies for accuracy.    Medications ADDED:  Advil 200mg  Medications CHANGED:  Humalog  Lantus  Medications REMOVED:   N/A     The list below reflects the updated PTA list. Comments regarding how patient may be taking medications differently can be found in the Admit Orders Activity  Prior to Admission Medications   Prescriptions Last Dose Informant   Dexcom G4 platinum transmitter (Dexcom G6 Transmitter) device     Sig: INSERT INTO SENSOR EVERY 10 DAYS REPLACE ONCE EVERY 90 DAYS 30 DAYS   blood-glucose sensor (Dexcom G6 Sensor) device     Sig: APPLY NEW SENSOR ONCE EVERY 10 DAYS FOR CONTINUOUS GLUCOSE MONITORING 30 DAYS   glucagon (Gvoke HypoPen 2-Pack) 1 mg/0.2 mL auto-injector     Sig: Inject as directed for severe low blood sugar. Repeat in 15 minutes if needed. Subcutaneous for 30 days   glucosamine sulfate 500 mg capsule     Sig: Take by mouth.   ibuprofen 200 mg tablet     Sig: Take 4 tablets (800 mg) by mouth every 6 hours.   insulin glargine (Lantus U-100 Insulin) 100 unit/mL injection     Sig: Inject 100 Units under the skin once daily at bedtime. Take as directed per insulin instructions.   insulin lispro (HumaLOG KwikPen Insulin) 100 unit/mL injection     Si UNITS SUBCUTANEOUS THREE TIMES A DAY WITH MEAL 90 DAYS   rosuvastatin (Crestor) 10 mg tablet     Sig: once every 24 hours.      Facility-Administered Medications: None        The list below reflects the updated allergy list. Please review each documented allergy for additional clarification and justification.  Allergies  Reviewed by Krysten Berg on 3/7/2024        Severity Reactions Comments    Dye Low Rash FA ,\"feels faintish\"            Pharmacy has been updated to N/A.    Sources used to complete the med history include PTA medication list, dispense history, after visit " summaries, Patient interview. Patient is a good historian.    Below are additional concerns with the patient's PTA list.  Patient prior to visit not taking Losartan, aspirin, or rosuvastatin. Patient advised on discharge to take Aspirin 81mg, Losartan, and to follow up with cardiology about Rosuvastatin.    Krysten Berg  Please reach out via [employer] Secure Chat for questions

## 2024-03-07 NOTE — PROGRESS NOTES
Medication Education     Medication education for Kevin L Bosworth was provided to the patient  for the following medication(s):    Aspirin  Losartan    Medication education provided by a Pharmacist:  ADR Counseling Dose, frequency, storage Refilling the medication  How the medication works and benefits of taking it    Identified potential barriers to education:  None    Method(s) of Education:  Verbal Written materials provided and reviewed    An opportunity to ask questions and receive answers was provided.     Assessment of understanding the patient :  2= meets goals/outcomes    Additional Notes (if applicable): Pt to follow-up with Dr. Daja James, PharmD

## 2024-03-08 ENCOUNTER — DOCUMENTATION (OUTPATIENT)
Dept: PRIMARY CARE | Facility: CLINIC | Age: 59
End: 2024-03-08
Payer: COMMERCIAL

## 2024-03-08 LAB
ATRIAL RATE: 81 BPM
P AXIS: 71 DEGREES
P OFFSET: 200 MS
P ONSET: 152 MS
PR INTERVAL: 142 MS
Q ONSET: 223 MS
QRS COUNT: 13 BEATS
QRS DURATION: 76 MS
QT INTERVAL: 372 MS
QTC CALCULATION(BAZETT): 432 MS
QTC FREDERICIA: 411 MS
R AXIS: 69 DEGREES
T AXIS: 84 DEGREES
T OFFSET: 409 MS
VENTRICULAR RATE: 81 BPM

## 2024-03-11 ENCOUNTER — PATIENT OUTREACH (OUTPATIENT)
Dept: PRIMARY CARE | Facility: CLINIC | Age: 59
End: 2024-03-11
Payer: COMMERCIAL

## 2024-03-11 NOTE — PROGRESS NOTES
Discharge Facility: [facility]  Discharge Diagnosis: Chest Pain  Admission Date: 3/6/24  Discharge Date: 3/7/24    PCP Appointment Date: none avail. Task to office  Specialist Appointment Date: unknown  Hospital Encounter and Summary: Linked     Jarred Milligan LPN

## 2024-03-12 ENCOUNTER — TELEPHONE (OUTPATIENT)
Dept: PRIMARY CARE | Facility: CLINIC | Age: 59
End: 2024-03-12
Payer: COMMERCIAL

## 2024-03-12 NOTE — TELEPHONE ENCOUNTER
----- Message from Mahsa Bañuelos LPN sent at 3/11/2024  3:43 PM EDT -----    ----- Message -----  From: Jarred Milligan LPN  Sent: 3/11/2024   2:08 PM EDT  To: Candie Low [place] [id number] Clinical Support Staff    Discharge Facility: [facility]  Discharge Diagnosis: Chest Pain  Admission Date: 3/6/24  Discharge Date: 3/7/24       Unsuccessful attempts x2 to reach patient for PCP Follow-up  Please have office staff reach out to patient and schedule an appointment within 7-13 days from discharge date.    Jarred Milligan LPN'

## 2024-03-21 ENCOUNTER — PATIENT OUTREACH (OUTPATIENT)
Dept: PRIMARY CARE | Facility: CLINIC | Age: 59
End: 2024-03-21
Payer: COMMERCIAL

## 2024-03-21 NOTE — PROGRESS NOTES
Unable to reach patient for call back after patient's follow up appointment with PCP.   LVM with call back number for patient to call if needed   If no voicemail available call attempts x 2 were made to contact the patient to assist with any questions or concerns patient may have.       Jarred Milligan LPN

## 2024-03-26 ENCOUNTER — OFFICE VISIT (OUTPATIENT)
Dept: PRIMARY CARE | Facility: CLINIC | Age: 59
End: 2024-03-26
Payer: COMMERCIAL

## 2024-03-26 VITALS
BODY MASS INDEX: 31.81 KG/M2 | DIASTOLIC BLOOD PRESSURE: 78 MMHG | WEIGHT: 209.2 LBS | OXYGEN SATURATION: 96 % | HEART RATE: 69 BPM | SYSTOLIC BLOOD PRESSURE: 138 MMHG

## 2024-03-26 DIAGNOSIS — R07.89 OTHER CHEST PAIN: Primary | ICD-10-CM

## 2024-03-26 DIAGNOSIS — M25.561 CHRONIC PAIN OF BOTH KNEES: ICD-10-CM

## 2024-03-26 DIAGNOSIS — M25.562 CHRONIC PAIN OF BOTH KNEES: ICD-10-CM

## 2024-03-26 DIAGNOSIS — G89.29 CHRONIC PAIN OF BOTH KNEES: ICD-10-CM

## 2024-03-26 PROCEDURE — 3078F DIAST BP <80 MM HG: CPT | Performed by: FAMILY MEDICINE

## 2024-03-26 PROCEDURE — 99214 OFFICE O/P EST MOD 30 MIN: CPT | Performed by: FAMILY MEDICINE

## 2024-03-26 PROCEDURE — 3075F SYST BP GE 130 - 139MM HG: CPT | Performed by: FAMILY MEDICINE

## 2024-03-26 PROCEDURE — 4010F ACE/ARB THERAPY RXD/TAKEN: CPT | Performed by: FAMILY MEDICINE

## 2024-03-26 PROCEDURE — 1036F TOBACCO NON-USER: CPT | Performed by: FAMILY MEDICINE

## 2024-03-26 ASSESSMENT — ENCOUNTER SYMPTOMS
LOSS OF SENSATION IN FEET: 0
DEPRESSION: 0
OCCASIONAL FEELINGS OF UNSTEADINESS: 0

## 2024-03-26 ASSESSMENT — PAIN SCALES - GENERAL: PAINLEVEL: 8

## 2024-03-26 NOTE — PROGRESS NOTES
Subjective   Patient ID: Kevin L Bosworth is a 58 y.o. male who presents for Hospital follow up / Chest Pain.    He is following up from his trip to the ER.  He states he was just sitting on the couch watching basketball and developed pain in the sternal area of his chest.  He waited for about 45 minutes and then called a friend who told him to call the squad.  They came out, gave him a Nitrostat and his pain gradually subsided until he arrived at the ER without any pain.  There he was worked up with enzymes and chest x-rays and all this was negative.  He is following up from that.  Has not had pain since that time.  He had not been having pain with activity at all.    He states his knees have been very painful for him.  Is an 8 out of 10 pain.  Its every day.  He has been reading about injecting plasma into there and wondering if that might be helpful for him.  He does not think he can do a stress test walking on a treadmill because of this.  Also, he had a very bad reaction to dye when he had a nuclear stress test in the past with Dr. Toure.         Review of Systems    Objective   /78   Pulse 69   Wt 94.9 kg (209 lb 3.2 oz)   SpO2 96%   BMI 31.81 kg/m²     Physical Exam  Constitutional:       Appearance: Normal appearance. He is not ill-appearing.   Pulmonary:      Effort: Pulmonary effort is normal.   Neurological:      Mental Status: He is alert.   Psychiatric:         Mood and Affect: Mood normal.         Assessment/Plan   Diagnoses and all orders for this visit:  Other chest pain  Chronic pain of both knees  -     XR knee 1-2 views bilateral; Future  I referred him over to see Dr. Sue.  I am not scheduling a stress test because I do not know which to do given that he cannot walk on a treadmill and that he has problems with the diet.  If he develops any more chest pain, he will let me know right away.  Let me know if he cannot get into see Dr. Sue within the next week or so.    He will get an x-ray  of his bilateral knees and then scheduled to see orthopedics.

## 2024-03-26 NOTE — PATIENT INSTRUCTIONS
You need to set up with cardiology to have a some sort of stress test.   Call and schedule Xrays and see orthopedic surgery.         PAIN MANAGEMENT IN ADULTS    What is pain? Pain is your body’s way to reacting to injury or illness. Everybody reacts to pain in different ways. What you think is painful may not be painful to someone else. But, pain is whatever you say it is!  What causes pain? Many things, such as an injury, surgery, or a disease can cause pain. Some pain is caused by pressure one a nerve, such as a cancerous tumor or slipped disc. Other pain is caused when nerves are cut as in an accident or surgery. After an injury or surgery you may not want to move the painful part of our body at all. But, you may have pain because you are not moving this body part. Sometimes there is no clear reason for your pain.    What are the different types of pain?  Acute pain is short?lived and lasts less than 3 months. Caregivers help first work to remove the cause of the pain, such as fixing a broken arm. Acute pain usually can be controlled or stopped with pain medicine.  Chronic pain lasts longer than 3?6 months. This kind of pain is often more complicated. Caregivers may use medicine along with other treatments, like self?hypnosis and relaxation to help you learn to deal with the chronic pain.  What is your pain like? Caregivers want you to talk to them about your pain. This helps them learn what may be causing the pain and how best to treat it.  Why is pain control important? Pain can affect your appetite, how well you sleep, your energy and your ability to do things. Pain can also affect your mood and relationship with others. If caregivers can help you control your pain, you will suffer less and can even heal faster.  How can pain medicine be given? Following are the many different ways pain medicine can be given depending on the kind of pain you are experiencing.  By mouth: you may be given pills or liquid to  swallow or you may be given a pill or liquid to put under your tongue. Some medicine can also be given as a lozenge like a cough drop or even a special lollipop.  Rectal: medicine in a suppository is put into your rectum.  Shot/Injection: pain medicine can be given as a shot/injection into an IV, into a muscle or under the skin  Topical: medicine in a cream or gel is spread over the skin.  Transdermal: medicine given in a patch and put onto the skin. This medicine is released slowly to give pain relief for as long as 72 hours.    How can you take pain medicine safely and make it work best for you? The following are many different ways pain medicine can be given depending on the kind of pain you have.  Some pain medicines can make you breathe less deeply and less often. The medicine may also make you sleepy, dizzy, and unsafe to drive a car or use heavy equipment. For these reasons, it is very important to follow your caregiver’s advice on how to use this medicine.  Sometimes the pain is worse when you first wake up in the morning. This may happened if you did not have enough pain medicine in your blood stream to last through the night. Caregivers may tell you to take a dose of pain medicine during the night.  Some foods, alcohol, and other medicines may cause unpleasant side effects when you take pain medicine. Follow your caregiver’s advice about how to prevent these problems.  Pain medicine can make you constipated. Straining with a BM can make you pain worse. Do not try to push the BM out if it’s too hard. Contact your caregiver if you become constipated.  Other non?drug pain control methods. There are many pain control techniques that can held you deal with pain even if it does not go away completely. It is important to practice some of the techniques when you don’t have pain if possible. This will help the technique work better during an attack of pain.  Cold and heat: both cold and heat can help lessen some  types of post?op pain. Caregivers will tell you if cold and/or hot packs will help your pain.  Distraction: teaches you to focus your attention on something other than pain. Playing cards or games, talking and visiting family may relax you and keep you from thinking about pain.  Hydrotherapy: is a gentle water exercise program. It can strengthen muscles that are not injured and lessen inflammation. Talk to your physical therapist or your caregiver about starting a hydrotherapy program.  Massage  Music  Physical therapy  Rest  Surgery/Nerve blocks  Call your caregiver if you have any of the following problems:  The pain medicine you are taking makes you sleepier than usual or confused  You have new pain or the pain seems different than before  You have constipation  Care agreement: You have the right to help plan your care. To help with this plan you must learn about your pain, what is causing it, and how it can be treated. You can then discuss treatment options with your caregivers. Work with them to decide what care will be used to treat you. You always have the right to refuse treatment.

## 2024-04-05 ENCOUNTER — PATIENT OUTREACH (OUTPATIENT)
Dept: PRIMARY CARE | Facility: CLINIC | Age: 59
End: 2024-04-05
Payer: COMMERCIAL

## 2024-04-05 NOTE — PROGRESS NOTES
Unable to reach patient for one month post discharge follow up call.   LVM with call back number for patient to call if needed   If no voicemail available call attempts x 2 were made to contact the patient to assist with any questions or concerns patient may have.     Jarred Milligan LPN

## 2024-04-09 ENCOUNTER — HOSPITAL ENCOUNTER (OUTPATIENT)
Facility: HOSPITAL | Age: 59
Setting detail: OBSERVATION
Discharge: HOME | End: 2024-04-09
Attending: EMERGENCY MEDICINE | Admitting: INTERNAL MEDICINE
Payer: COMMERCIAL

## 2024-04-09 ENCOUNTER — APPOINTMENT (OUTPATIENT)
Dept: RADIOLOGY | Facility: HOSPITAL | Age: 59
End: 2024-04-09
Payer: COMMERCIAL

## 2024-04-09 ENCOUNTER — APPOINTMENT (OUTPATIENT)
Dept: CARDIOLOGY | Facility: HOSPITAL | Age: 59
End: 2024-04-09
Payer: COMMERCIAL

## 2024-04-09 VITALS
TEMPERATURE: 99 F | HEIGHT: 68 IN | WEIGHT: 195 LBS | OXYGEN SATURATION: 93 % | BODY MASS INDEX: 29.55 KG/M2 | HEART RATE: 74 BPM | RESPIRATION RATE: 18 BRPM | SYSTOLIC BLOOD PRESSURE: 140 MMHG | DIASTOLIC BLOOD PRESSURE: 65 MMHG

## 2024-04-09 DIAGNOSIS — R07.89 OTHER CHEST PAIN: ICD-10-CM

## 2024-04-09 DIAGNOSIS — E10.3523: Primary | ICD-10-CM

## 2024-04-09 DIAGNOSIS — R07.9 CHEST PAIN, UNSPECIFIED TYPE: ICD-10-CM

## 2024-04-09 DIAGNOSIS — K21.9 GASTROESOPHAGEAL REFLUX DISEASE WITHOUT ESOPHAGITIS: ICD-10-CM

## 2024-04-09 DIAGNOSIS — I10 PRIMARY HYPERTENSION: ICD-10-CM

## 2024-04-09 LAB
ALBUMIN SERPL-MCNC: 4.1 G/DL (ref 3.5–5)
ALP BLD-CCNC: 105 U/L (ref 35–125)
ALT SERPL-CCNC: 52 U/L (ref 5–40)
ANION GAP SERPL CALC-SCNC: 11 MMOL/L
AST SERPL-CCNC: 21 U/L (ref 5–40)
BASOPHILS # BLD AUTO: 0.04 X10*3/UL (ref 0–0.1)
BASOPHILS NFR BLD AUTO: 0.4 %
BILIRUB SERPL-MCNC: 0.9 MG/DL (ref 0.1–1.2)
BUN SERPL-MCNC: 16 MG/DL (ref 8–25)
CALCIUM SERPL-MCNC: 9.1 MG/DL (ref 8.5–10.4)
CHLORIDE SERPL-SCNC: 100 MMOL/L (ref 97–107)
CO2 SERPL-SCNC: 27 MMOL/L (ref 24–31)
CREAT SERPL-MCNC: 1.1 MG/DL (ref 0.4–1.6)
EGFRCR SERPLBLD CKD-EPI 2021: 78 ML/MIN/1.73M*2
EOSINOPHIL # BLD AUTO: 0.13 X10*3/UL (ref 0–0.7)
EOSINOPHIL NFR BLD AUTO: 1.3 %
ERYTHROCYTE [DISTWIDTH] IN BLOOD BY AUTOMATED COUNT: 12.4 % (ref 11.5–14.5)
GLUCOSE BLD MANUAL STRIP-MCNC: 234 MG/DL (ref 74–99)
GLUCOSE BLD MANUAL STRIP-MCNC: 252 MG/DL (ref 74–99)
GLUCOSE BLD MANUAL STRIP-MCNC: 291 MG/DL (ref 74–99)
GLUCOSE SERPL-MCNC: 277 MG/DL (ref 65–99)
HCT VFR BLD AUTO: 44.9 % (ref 41–52)
HGB BLD-MCNC: 14.3 G/DL (ref 13.5–17.5)
IMM GRANULOCYTES # BLD AUTO: 0.04 X10*3/UL (ref 0–0.7)
IMM GRANULOCYTES NFR BLD AUTO: 0.4 % (ref 0–0.9)
LIPASE SERPL-CCNC: 20 U/L (ref 16–63)
LYMPHOCYTES # BLD AUTO: 2.18 X10*3/UL (ref 1.2–4.8)
LYMPHOCYTES NFR BLD AUTO: 22.5 %
MCH RBC QN AUTO: 28.7 PG (ref 26–34)
MCHC RBC AUTO-ENTMCNC: 31.8 G/DL (ref 32–36)
MCV RBC AUTO: 90 FL (ref 80–100)
MONOCYTES # BLD AUTO: 1.03 X10*3/UL (ref 0.1–1)
MONOCYTES NFR BLD AUTO: 10.6 %
NEUTROPHILS # BLD AUTO: 6.29 X10*3/UL (ref 1.2–7.7)
NEUTROPHILS NFR BLD AUTO: 64.8 %
NRBC BLD-RTO: 0 /100 WBCS (ref 0–0)
PLATELET # BLD AUTO: 266 X10*3/UL (ref 150–450)
POTASSIUM SERPL-SCNC: 4.2 MMOL/L (ref 3.4–5.1)
PROT SERPL-MCNC: 6.6 G/DL (ref 5.9–7.9)
RBC # BLD AUTO: 4.98 X10*6/UL (ref 4.5–5.9)
SODIUM SERPL-SCNC: 138 MMOL/L (ref 133–145)
TROPONIN T SERPL-MCNC: 14 NG/L
TROPONIN T SERPL-MCNC: 15 NG/L
TROPONIN T SERPL-MCNC: 16 NG/L
WBC # BLD AUTO: 9.7 X10*3/UL (ref 4.4–11.3)

## 2024-04-09 PROCEDURE — 83690 ASSAY OF LIPASE: CPT | Performed by: EMERGENCY MEDICINE

## 2024-04-09 PROCEDURE — 84295 ASSAY OF SERUM SODIUM: CPT | Performed by: EMERGENCY MEDICINE

## 2024-04-09 PROCEDURE — 2500000004 HC RX 250 GENERAL PHARMACY W/ HCPCS (ALT 636 FOR OP/ED): Performed by: EMERGENCY MEDICINE

## 2024-04-09 PROCEDURE — G0378 HOSPITAL OBSERVATION PER HR: HCPCS

## 2024-04-09 PROCEDURE — 36415 COLL VENOUS BLD VENIPUNCTURE: CPT | Performed by: EMERGENCY MEDICINE

## 2024-04-09 PROCEDURE — 84484 ASSAY OF TROPONIN QUANT: CPT | Performed by: EMERGENCY MEDICINE

## 2024-04-09 PROCEDURE — 71045 X-RAY EXAM CHEST 1 VIEW: CPT | Performed by: SURGERY

## 2024-04-09 PROCEDURE — 85025 COMPLETE CBC W/AUTO DIFF WBC: CPT | Performed by: EMERGENCY MEDICINE

## 2024-04-09 PROCEDURE — 2500000004 HC RX 250 GENERAL PHARMACY W/ HCPCS (ALT 636 FOR OP/ED): Performed by: NURSE PRACTITIONER

## 2024-04-09 PROCEDURE — 96374 THER/PROPH/DIAG INJ IV PUSH: CPT | Performed by: INTERNAL MEDICINE

## 2024-04-09 PROCEDURE — 99285 EMERGENCY DEPT VISIT HI MDM: CPT | Mod: 25

## 2024-04-09 PROCEDURE — C9113 INJ PANTOPRAZOLE SODIUM, VIA: HCPCS | Performed by: NURSE PRACTITIONER

## 2024-04-09 PROCEDURE — 96375 TX/PRO/DX INJ NEW DRUG ADDON: CPT | Performed by: INTERNAL MEDICINE

## 2024-04-09 PROCEDURE — 2500000001 HC RX 250 WO HCPCS SELF ADMINISTERED DRUGS (ALT 637 FOR MEDICARE OP): Performed by: INTERNAL MEDICINE

## 2024-04-09 PROCEDURE — 71045 X-RAY EXAM CHEST 1 VIEW: CPT

## 2024-04-09 PROCEDURE — 2500000001 HC RX 250 WO HCPCS SELF ADMINISTERED DRUGS (ALT 637 FOR MEDICARE OP): Performed by: EMERGENCY MEDICINE

## 2024-04-09 PROCEDURE — 82947 ASSAY GLUCOSE BLOOD QUANT: CPT | Mod: 59

## 2024-04-09 PROCEDURE — 93005 ELECTROCARDIOGRAM TRACING: CPT

## 2024-04-09 RX ORDER — NITROGLYCERIN 0.4 MG/1
0.4 TABLET SUBLINGUAL EVERY 5 MIN PRN
Status: DISCONTINUED | OUTPATIENT
Start: 2024-04-09 | End: 2024-04-10 | Stop reason: HOSPADM

## 2024-04-09 RX ORDER — ONDANSETRON 4 MG/1
4 TABLET, FILM COATED ORAL EVERY 8 HOURS PRN
Status: DISCONTINUED | OUTPATIENT
Start: 2024-04-09 | End: 2024-04-10 | Stop reason: HOSPADM

## 2024-04-09 RX ORDER — NAPROXEN SODIUM 220 MG/1
81 TABLET, FILM COATED ORAL DAILY
Status: DISCONTINUED | OUTPATIENT
Start: 2024-04-09 | End: 2024-04-10 | Stop reason: HOSPADM

## 2024-04-09 RX ORDER — INSULIN GLARGINE 100 [IU]/ML
100 INJECTION, SOLUTION SUBCUTANEOUS NIGHTLY
Status: CANCELLED | OUTPATIENT
Start: 2024-04-09

## 2024-04-09 RX ORDER — PANTOPRAZOLE SODIUM 40 MG/1
40 TABLET, DELAYED RELEASE ORAL 2 TIMES DAILY
Qty: 60 TABLET | Refills: 1 | Status: SHIPPED | OUTPATIENT
Start: 2024-04-09 | End: 2024-05-20 | Stop reason: SDUPTHER

## 2024-04-09 RX ORDER — DEXTROSE 50 % IN WATER (D50W) INTRAVENOUS SYRINGE
25
Status: DISCONTINUED | OUTPATIENT
Start: 2024-04-09 | End: 2024-04-10 | Stop reason: HOSPADM

## 2024-04-09 RX ORDER — LIDOCAINE HYDROCHLORIDE 20 MG/ML
15 SOLUTION OROPHARYNGEAL ONCE
Status: COMPLETED | OUTPATIENT
Start: 2024-04-09 | End: 2024-04-09

## 2024-04-09 RX ORDER — IBUPROFEN 400 MG/1
800 TABLET ORAL EVERY 6 HOURS
Status: DISCONTINUED | OUTPATIENT
Start: 2024-04-09 | End: 2024-04-09

## 2024-04-09 RX ORDER — LOSARTAN POTASSIUM 25 MG/1
25 TABLET ORAL DAILY
Status: DISCONTINUED | OUTPATIENT
Start: 2024-04-09 | End: 2024-04-10 | Stop reason: HOSPADM

## 2024-04-09 RX ORDER — DEXTROSE 50 % IN WATER (D50W) INTRAVENOUS SYRINGE
25
Status: CANCELLED | OUTPATIENT
Start: 2024-04-09

## 2024-04-09 RX ORDER — KETOROLAC TROMETHAMINE 30 MG/ML
30 INJECTION, SOLUTION INTRAMUSCULAR; INTRAVENOUS ONCE
Status: COMPLETED | OUTPATIENT
Start: 2024-04-09 | End: 2024-04-09

## 2024-04-09 RX ORDER — ATORVASTATIN CALCIUM 20 MG/1
20 TABLET, FILM COATED ORAL DAILY
Qty: 30 TABLET | Refills: 1 | Status: SHIPPED | OUTPATIENT
Start: 2024-04-09 | End: 2024-05-20 | Stop reason: SDUPTHER

## 2024-04-09 RX ORDER — PANTOPRAZOLE SODIUM 40 MG/10ML
40 INJECTION, POWDER, LYOPHILIZED, FOR SOLUTION INTRAVENOUS DAILY
Status: DISCONTINUED | OUTPATIENT
Start: 2024-04-09 | End: 2024-04-10 | Stop reason: HOSPADM

## 2024-04-09 RX ORDER — LISINOPRIL 10 MG/1
10 TABLET ORAL DAILY
Qty: 30 TABLET | Refills: 1 | Status: SHIPPED | OUTPATIENT
Start: 2024-04-09 | End: 2024-04-09

## 2024-04-09 RX ORDER — ONDANSETRON HYDROCHLORIDE 2 MG/ML
4 INJECTION, SOLUTION INTRAVENOUS EVERY 8 HOURS PRN
Status: DISCONTINUED | OUTPATIENT
Start: 2024-04-09 | End: 2024-04-10 | Stop reason: HOSPADM

## 2024-04-09 RX ORDER — KETOROLAC TROMETHAMINE 30 MG/ML
30 INJECTION, SOLUTION INTRAMUSCULAR; INTRAVENOUS ONCE
Status: DISCONTINUED | OUTPATIENT
Start: 2024-04-09 | End: 2024-04-09

## 2024-04-09 RX ORDER — DEXTROSE 50 % IN WATER (D50W) INTRAVENOUS SYRINGE
12.5
Status: CANCELLED | OUTPATIENT
Start: 2024-04-09

## 2024-04-09 RX ORDER — INSULIN GLARGINE 100 [IU]/ML
50 INJECTION, SOLUTION SUBCUTANEOUS 2 TIMES DAILY
Status: DISCONTINUED | OUTPATIENT
Start: 2024-04-09 | End: 2024-04-10 | Stop reason: HOSPADM

## 2024-04-09 RX ORDER — INSULIN GLARGINE 100 [IU]/ML
50 INJECTION, SOLUTION SUBCUTANEOUS 2 TIMES DAILY
Status: DISCONTINUED | OUTPATIENT
Start: 2024-04-09 | End: 2024-04-09

## 2024-04-09 RX ORDER — INSULIN LISPRO 100 [IU]/ML
20 INJECTION, SOLUTION INTRAVENOUS; SUBCUTANEOUS
Status: CANCELLED | OUTPATIENT
Start: 2024-04-09

## 2024-04-09 RX ORDER — ACETAMINOPHEN 160 MG/5ML
650 SOLUTION ORAL EVERY 4 HOURS PRN
Status: DISCONTINUED | OUTPATIENT
Start: 2024-04-09 | End: 2024-04-10 | Stop reason: HOSPADM

## 2024-04-09 RX ORDER — ACETAMINOPHEN 650 MG/1
650 SUPPOSITORY RECTAL EVERY 4 HOURS PRN
Status: DISCONTINUED | OUTPATIENT
Start: 2024-04-09 | End: 2024-04-10 | Stop reason: HOSPADM

## 2024-04-09 RX ORDER — ASPIRIN 81 MG/1
81 TABLET ORAL DAILY
COMMUNITY

## 2024-04-09 RX ORDER — NAPROXEN SODIUM 220 MG/1
81 TABLET, FILM COATED ORAL DAILY
Status: CANCELLED | OUTPATIENT
Start: 2024-04-09

## 2024-04-09 RX ORDER — ACETAMINOPHEN 325 MG/1
650 TABLET ORAL EVERY 4 HOURS PRN
Status: DISCONTINUED | OUTPATIENT
Start: 2024-04-09 | End: 2024-04-10 | Stop reason: HOSPADM

## 2024-04-09 RX ORDER — ATORVASTATIN CALCIUM 80 MG/1
80 TABLET, FILM COATED ORAL NIGHTLY
Status: DISCONTINUED | OUTPATIENT
Start: 2024-04-09 | End: 2024-04-10 | Stop reason: HOSPADM

## 2024-04-09 RX ORDER — INSULIN LISPRO 100 [IU]/ML
0-10 INJECTION, SOLUTION INTRAVENOUS; SUBCUTANEOUS
Status: DISCONTINUED | OUTPATIENT
Start: 2024-04-09 | End: 2024-04-10 | Stop reason: HOSPADM

## 2024-04-09 RX ORDER — ALUMINUM HYDROXIDE, MAGNESIUM HYDROXIDE, AND SIMETHICONE 1200; 120; 1200 MG/30ML; MG/30ML; MG/30ML
30 SUSPENSION ORAL ONCE
Status: COMPLETED | OUTPATIENT
Start: 2024-04-09 | End: 2024-04-09

## 2024-04-09 RX ADMIN — LOSARTAN POTASSIUM 25 MG: 25 TABLET, FILM COATED ORAL at 17:03

## 2024-04-09 RX ADMIN — LIDOCAINE HYDROCHLORIDE 15 ML: 20 SOLUTION ORAL at 04:31

## 2024-04-09 RX ADMIN — ALUMINUM HYDROXIDE, MAGNESIUM HYDROXIDE, AND SIMETHICONE 30 ML: 200; 200; 20 SUSPENSION ORAL at 04:31

## 2024-04-09 RX ADMIN — PANTOPRAZOLE SODIUM 40 MG: 40 INJECTION, POWDER, FOR SOLUTION INTRAVENOUS at 17:03

## 2024-04-09 RX ADMIN — ATORVASTATIN CALCIUM 80 MG: 80 TABLET, FILM COATED ORAL at 20:36

## 2024-04-09 RX ADMIN — KETOROLAC TROMETHAMINE 30 MG: 30 INJECTION, SOLUTION INTRAMUSCULAR at 05:25

## 2024-04-09 RX ADMIN — ASPIRIN 81 MG CHEWABLE TABLET 81 MG: 81 TABLET CHEWABLE at 17:02

## 2024-04-09 SDOH — ECONOMIC STABILITY: HOUSING INSECURITY
IN THE LAST 12 MONTHS, WAS THERE A TIME WHEN YOU DID NOT HAVE A STEADY PLACE TO SLEEP OR SLEPT IN A SHELTER (INCLUDING NOW)?: NO

## 2024-04-09 SDOH — HEALTH STABILITY: MENTAL HEALTH: HOW MANY STANDARD DRINKS CONTAINING ALCOHOL DO YOU HAVE ON A TYPICAL DAY?: PATIENT DOES NOT DRINK

## 2024-04-09 SDOH — SOCIAL STABILITY: SOCIAL INSECURITY
WITHIN THE LAST YEAR, HAVE YOU BEEN KICKED, HIT, SLAPPED, OR OTHERWISE PHYSICALLY HURT BY YOUR PARTNER OR EX-PARTNER?: NO

## 2024-04-09 SDOH — HEALTH STABILITY: MENTAL HEALTH
STRESS IS WHEN SOMEONE FEELS TENSE, NERVOUS, ANXIOUS, OR CAN'T SLEEP AT NIGHT BECAUSE THEIR MIND IS TROUBLED. HOW STRESSED ARE YOU?: NOT AT ALL

## 2024-04-09 SDOH — SOCIAL STABILITY: SOCIAL NETWORK: ARE YOU MARRIED, WIDOWED, DIVORCED, SEPARATED, NEVER MARRIED, OR LIVING WITH A PARTNER?: NEVER MARRIED

## 2024-04-09 SDOH — ECONOMIC STABILITY: FOOD INSECURITY: WITHIN THE PAST 12 MONTHS, YOU WORRIED THAT YOUR FOOD WOULD RUN OUT BEFORE YOU GOT MONEY TO BUY MORE.: NEVER TRUE

## 2024-04-09 SDOH — SOCIAL STABILITY: SOCIAL NETWORK: HOW OFTEN DO YOU ATTEND CHURCH OR RELIGIOUS SERVICES?: NEVER

## 2024-04-09 SDOH — ECONOMIC STABILITY: INCOME INSECURITY: IN THE LAST 12 MONTHS, WAS THERE A TIME WHEN YOU WERE NOT ABLE TO PAY THE MORTGAGE OR RENT ON TIME?: NO

## 2024-04-09 SDOH — HEALTH STABILITY: MENTAL HEALTH: HOW OFTEN DO YOU HAVE 6 OR MORE DRINKS ON ONE OCCASION?: NEVER

## 2024-04-09 SDOH — ECONOMIC STABILITY: INCOME INSECURITY: HOW HARD IS IT FOR YOU TO PAY FOR THE VERY BASICS LIKE FOOD, HOUSING, MEDICAL CARE, AND HEATING?: NOT VERY HARD

## 2024-04-09 SDOH — HEALTH STABILITY: MENTAL HEALTH: HOW OFTEN DO YOU HAVE A DRINK CONTAINING ALCOHOL?: NEVER

## 2024-04-09 SDOH — SOCIAL STABILITY: SOCIAL NETWORK
DO YOU BELONG TO ANY CLUBS OR ORGANIZATIONS SUCH AS CHURCH GROUPS UNIONS, FRATERNAL OR ATHLETIC GROUPS, OR SCHOOL GROUPS?: NO

## 2024-04-09 SDOH — ECONOMIC STABILITY: FOOD INSECURITY: WITHIN THE PAST 12 MONTHS, THE FOOD YOU BOUGHT JUST DIDN'T LAST AND YOU DIDN'T HAVE MONEY TO GET MORE.: NEVER TRUE

## 2024-04-09 SDOH — SOCIAL STABILITY: SOCIAL NETWORK: HOW OFTEN DO YOU GET TOGETHER WITH FRIENDS OR RELATIVES?: MORE THAN THREE TIMES A WEEK

## 2024-04-09 SDOH — ECONOMIC STABILITY: TRANSPORTATION INSECURITY
IN THE PAST 12 MONTHS, HAS THE LACK OF TRANSPORTATION KEPT YOU FROM MEDICAL APPOINTMENTS OR FROM GETTING MEDICATIONS?: NO

## 2024-04-09 SDOH — SOCIAL STABILITY: SOCIAL INSECURITY: WITHIN THE LAST YEAR, HAVE YOU BEEN AFRAID OF YOUR PARTNER OR EX-PARTNER?: NO

## 2024-04-09 SDOH — ECONOMIC STABILITY: INCOME INSECURITY: IN THE PAST 12 MONTHS, HAS THE ELECTRIC, GAS, OIL, OR WATER COMPANY THREATENED TO SHUT OFF SERVICE IN YOUR HOME?: NO

## 2024-04-09 SDOH — ECONOMIC STABILITY: TRANSPORTATION INSECURITY
IN THE PAST 12 MONTHS, HAS LACK OF TRANSPORTATION KEPT YOU FROM MEETINGS, WORK, OR FROM GETTING THINGS NEEDED FOR DAILY LIVING?: NO

## 2024-04-09 SDOH — SOCIAL STABILITY: SOCIAL NETWORK: HOW OFTEN DO YOU ATTENT MEETINGS OF THE CLUB OR ORGANIZATION YOU BELONG TO?: NEVER

## 2024-04-09 SDOH — SOCIAL STABILITY: SOCIAL INSECURITY: WITHIN THE LAST YEAR, HAVE YOU BEEN HUMILIATED OR EMOTIONALLY ABUSED IN OTHER WAYS BY YOUR PARTNER OR EX-PARTNER?: NO

## 2024-04-09 SDOH — SOCIAL STABILITY: SOCIAL INSECURITY
WITHIN THE LAST YEAR, HAVE TO BEEN RAPED OR FORCED TO HAVE ANY KIND OF SEXUAL ACTIVITY BY YOUR PARTNER OR EX-PARTNER?: NO

## 2024-04-09 SDOH — HEALTH STABILITY: PHYSICAL HEALTH: ON AVERAGE, HOW MANY MINUTES DO YOU ENGAGE IN EXERCISE AT THIS LEVEL?: 0 MIN

## 2024-04-09 SDOH — SOCIAL STABILITY: SOCIAL NETWORK
IN A TYPICAL WEEK, HOW MANY TIMES DO YOU TALK ON THE PHONE WITH FAMILY, FRIENDS, OR NEIGHBORS?: MORE THAN THREE TIMES A WEEK

## 2024-04-09 ASSESSMENT — ENCOUNTER SYMPTOMS
GASTROINTESTINAL NEGATIVE: 1
RESPIRATORY NEGATIVE: 1
ALLERGIC/IMMUNOLOGIC NEGATIVE: 1
ENDOCRINE NEGATIVE: 1
EYES NEGATIVE: 1
NEUROLOGICAL NEGATIVE: 1
CONSTITUTIONAL NEGATIVE: 1
HEMATOLOGIC/LYMPHATIC NEGATIVE: 1
PSYCHIATRIC NEGATIVE: 1
MUSCULOSKELETAL NEGATIVE: 1

## 2024-04-09 ASSESSMENT — LIFESTYLE VARIABLES
SKIP TO QUESTIONS 9-10: 1
AUDIT-C TOTAL SCORE: 0

## 2024-04-09 ASSESSMENT — COLUMBIA-SUICIDE SEVERITY RATING SCALE - C-SSRS
2. HAVE YOU ACTUALLY HAD ANY THOUGHTS OF KILLING YOURSELF?: NO
1. IN THE PAST MONTH, HAVE YOU WISHED YOU WERE DEAD OR WISHED YOU COULD GO TO SLEEP AND NOT WAKE UP?: NO
6. HAVE YOU EVER DONE ANYTHING, STARTED TO DO ANYTHING, OR PREPARED TO DO ANYTHING TO END YOUR LIFE?: NO

## 2024-04-09 ASSESSMENT — PAIN SCALES - GENERAL: PAINLEVEL_OUTOF10: 0 - NO PAIN

## 2024-04-09 ASSESSMENT — PAIN - FUNCTIONAL ASSESSMENT: PAIN_FUNCTIONAL_ASSESSMENT: 0-10

## 2024-04-09 NOTE — ED PROVIDER NOTES
HPI   Chief Complaint   Patient presents with    Chest Pain       HPI  50-year-old male presents with mid epigastric sharp pain.  Patient states that he developed sharp epigastric pain about 2 hours prior to arrival to the emergency department.  Brought in by EMS.  This is similar pain to when he presented March 6 was admitted to the hospital for chest pain.  He is supposed to follow-up with cardiology but does not have an appointment yet.  EMS administered nitroglycerin but it did not seem to help with his pain.  No shortness of breath or dizziness or lightheadedness.  It does not radiate anywhere.  He does not member eating anything that may have caused this pain.  No other complaints.                  [place] Coma Scale Score: 15                     Patient History   Past Medical History:   Diagnosis Date    Personal history of other diseases of the circulatory system     History of hypertension    Personal history of other endocrine, nutritional and metabolic disease     History of diabetes mellitus    Personal history of other endocrine, nutritional and metabolic disease     History of diabetic retinopathy     Past Surgical History:   Procedure Laterality Date    APPENDECTOMY  07/14/2016    Appendectomy    CATARACT EXTRACTION  07/14/2016    Cataract Surgery    OTHER SURGICAL HISTORY  07/14/2016    L[facility] Retina Laser Treatment     No family history on file.  Social History     Tobacco Use    Smoking status: Never    Smokeless tobacco: Never   Vaping Use    Vaping Use: Never used   Substance Use Topics    Alcohol use: Never    Drug use: Never       Physical Exam   ED Triage Vitals [04/09/24 0414]   Temperature Heart Rate Respirations BP   36.9 °C (98.4 °F) 72 18 148/89      Pulse Ox Temp Source Heart Rate Source Patient Position   94 % Oral Monitor --      BP Location FiO2 (%)     -- --       Physical Exam  General:  Awake, alert, no acute distress.  Head: Normocephalic, Atraumatic  Neck: Supple, trachea  midline, no stridor  Skin: Warm and dry, no rashes   Lungs: Clear to auscultation bilaterally no acute respiratory distress, speaking in full sentences without difficulty  CV: Regular Rate Rhythm with no obvious murmurs gallops rubs noted, no jugular venous distention, no pedal edema   Abdomen: Soft, mild epigastric tenderness palpation, negative Duran's, Rovsing's, McBurney's point, nondistended, positive bowel sounds, no peritoneal signs  Neuro:  No gross focal neurologic deficits, NIH is 0  Musculoskeletal:  Full range of motion in all 4 extremities  Psychiatric:  Alert oriented x 3, Good insight into condition.  ED Course & MDM   ED Course as of 04/10/24 2153   Tue Apr 09, 2024 0420 My EKG interpretation:  Normal sinus rhythm 71 bpm normal axis IL interval 142 QTc 423 no ectopy or acute ischemic changes noted [KW]      ED Course User Index  [KW] Raz PILLO DO [place]         Diagnoses as of 04/10/24 2153   Other chest pain       Medical Decision Making  Patient treated GI cocktail, Toradol in the Emergency Department.  He was provided nitroglycerin prehospital but did not obtain any relief from that.  His EKG is unremarkable.  I reviewed his visit 1 month ago.  His first troponin is 16.  It was 7 during his last visit.  His symptoms only started 2 hours prior to coming to the hospital.  Given the fact that his troponin is elevated and it was normal last time and he did not have a stress test felt to benefit from admission.  Contacted Dr. Lopez for admission.      Procedure  Procedures     Raz Taylor,   04/09/24 0540       Raz Taylor,   04/09/24 0554       Raz Taylor,   04/09/24 0608       Raz Taylor DO  04/10/24 2153

## 2024-04-09 NOTE — PROGRESS NOTES
04/09/24 1502   Physical Activity   On average, how many minutes do you engage in exercise at this level? 0 min   Financial Resource Strain   How hard is it for you to pay for the very basics like food, housing, medical care, and heating? Not very   Housing Stability   In the last 12 months, was there a time when you were not able to pay the mortgage or rent on time? N   In the last 12 months, was there a time when you did not have a steady place to sleep or slept in a shelter (including now)? N   Transportation Needs   In the past 12 months, has lack of transportation kept you from medical appointments or from getting medications? no   In the past 12 months, has lack of transportation kept you from meetings, work, or from getting things needed for daily living? No   Food Insecurity   Within the past 12 months, you worried that your food would run out before you got the money to buy more. Never true   Within the past 12 months, the food you bought just didn't last and you didn't have money to get more. Never true   Stress   Do you feel stress - tense, restless, nervous, or anxious, or unable to sleep at night because your mind is troubled all the time - these days? Not at all   Social Connections   In a typical week, how many times do you talk on the phone with family, friends, or neighbors? More than 3   How often do you get together with friends or relatives? More than 3   How often do you attend Episcopalian or [employer] services? Never   Do you belong to any clubs or organizations such as Episcopalian groups, unions, fraternal or athletic groups, or school groups? No   How often do you attend meetings of the clubs or organizations you belong to? Never   Are you , , , , never , or living with a partner? Never marrie   Intimate Partner Violence   Within the last year, have you been afraid of your partner or ex-partner? No   Within the last year, have you been humiliated or emotionally  abused in other ways by your partner or ex-partner? No   Within the last year, have you been kicked, hit, slapped, or otherwise physically hurt by your partner or ex-partner? No   Within the last year, have you been raped or forced to have any kind of sexual activity by your partner or ex-partner? No   Alcohol Use   Q1: How often do you have a drink containing alcohol? Never   Q2: How many drinks containing alcohol do you have on a typical day when you are drinking? None   Q3: How often do you have six or more drinks on one occasion? Never   Utilities   In the past 12 months has the electric, gas, oil, or water company threatened to shut off services in your home? No     Met with patient at bedside.  An explanation of discharge planning was provided.  Assessment completed. Patient resides alone in a two story house. There are three steps to enter the home.  Patient is independent with all ADL's.  He does not require any assistive devices.  Patient  does not require oxygen or cpap.  Patient is able to cook, clean, shop and drive.  Still works. Patient does not smoke or drink alcohol.  Patient is diabetic.  Uses the CGM System for continuous blood glucose monitoring. Does not have a POA. Declines paperwork.  Declines home going needs.

## 2024-04-09 NOTE — DISCHARGE SUMMARY
Discharge Diagnosis  Chest pain    Issues Requiring Follow-Up  Chest pain  Hypertension  Elevated ALT  Diabetes mellitus    Discharge Meds     Your medication list        START taking these medications        Instructions Last Dose Given Next Dose Due   atorvastatin 20 mg tablet  Commonly known as: Lipitor      Take 1 tablet (20 mg) by mouth once daily.       lisinopril 10 mg tablet      Take 1 tablet (10 mg) by mouth once daily.              CHANGE how you take these medications        Instructions Last Dose Given Next Dose Due   aspirin 81 mg EC tablet  What changed: Another medication with the same name was removed. Continue taking this medication, and follow the directions you see here.                  CONTINUE taking these medications        Instructions Last Dose Given Next Dose Due   Dexcom G6 Sensor device  Generic drug: blood-glucose sensor      APPLY NEW SENSOR ONCE EVERY 10 DAYS FOR CONTINUOUS GLUCOSE MONITORING 30 DAYS       Dexcom G6 Transmitter device  Generic drug: blood-glucose transmitter device      INSERT INTO SENSOR EVERY 10 DAYS REPLACE ONCE EVERY 90 DAYS 30 DAYS       Gvoke HypoPen 2-Pack 1 mg/0.2 mL auto-injector  Generic drug: glucagon           HumaLOG KwikPen Insulin 100 unit/mL injection  Generic drug: insulin lispro      20 UNITS SUBCUTANEOUS THREE TIMES A DAY WITH MEAL 90 DAYS       Lantus U-100 Insulin 100 unit/mL injection  Generic drug: insulin glargine      Inject 100 Units under the skin once daily at bedtime. Take as directed per insulin instructions.       losartan 25 mg tablet  Commonly known as: Cozaar      Take 1 tablet (25 mg) by mouth once daily. Do not start before March 8, 2024.              STOP taking these medications      ibuprofen 200 mg tablet                  Where to Get Your Medications        These medications were sent to [facility]  [address] [facility] [address]      Hours: 9 AM to 6 PM Mon-Fri, 9 AM to 1 PM Sat Phone: [phone]    atorvastatin 20 mg tablet  lisinopril 10 mg tablet         Test Results Pending At Discharge  Pending Labs       No current pending labs.            Hospital Course   The patient was admitted with complaint just regarding done came back normal.  Patient had epigastric pain.  With the most likely due to GERD.  Patient started on Protonix.  Pain is slightly better.  Patient with cardiology.  Cardiology cleared the patient to go home with advised to follow-up as an outpatient for stress test.  Patient being discharged home in a stable condition.    Pertinent Physical Exam At Time of Discharge  Physical Exam    Outpatient Follow-Up  No future appointments.      Abelardo Lopez MD

## 2024-04-09 NOTE — H&P
Chief Complaint Chest pain    History Of Present Illness  Kevin L Bosworth is a very pleasant 58 y.o.  male presenting with chest pain.  He was in his usual state of health until this morning around 2 AM.  He was sleeping when he woke up with chest pain.  He reports a pressure-like pain in the lower chest non-radiating without notable aggravating relieving factors.  911 was called.  He took Nitroglycerin and Aspirin without relief.  He presented to the emergency department.  He reports mid upper abdominal discomfort upon palpation by the provider in the emergency department with associated nausea.  He denies any vomiting or diarrhea.  He states that he fell asleep in the emergency department and upon awakening, the chest pain had resolved.  He reports a similar episode in March 2024.  At that time, he developed the same chest pain while sitting on the couch.  He treated with Nitroglycerin with relief.  He presented to the emergency department and was observed.  He was discharged home.  He followed up outpatient with his primary care provider and was advised to schedule an appointment with a Cardiologist.  He has not yet scheduled an appointment with Cardiology.  He states that he is unable to ambulate on a treadmill due to chronic knee pain.  He also reports an allergy to dye.  Since the episode in March 2024, he has been taking Aspirin 81 mg p.o. daily and was also started on Losartan for hypertension.  He was taking Ibuprofen daily for knee pain however he stopped taking Ibuprofen daily weeks ago as he was advised not to take ibuprofen with Losartan per his primary care provider.  In the emergency department, initial workup was done.  CMP showed an AST 21.  ALT 52.  Alk phosphatase 105.  Lipase 20.  CBC was unremarkable.  Troponin 16, 14, 15.  Chest x-ray per radiology showed nothing acute.  12 lead EKG showed normal sinus rhythm, nonspecific ST-T wave changes, no acute ischemia.  He was treated in the  emergency department and was admitted.  Cardiology was consulted.     Past Medical History  Past Medical History:   Diagnosis Date    Personal history of other diseases of the circulatory system     History of hypertension    Personal history of other endocrine, nutritional and metabolic disease     History of diabetes mellitus    Personal history of other endocrine, nutritional and metabolic disease     History of diabetic retinopathy       Surgical History  Past Surgical History:   Procedure Laterality Date    APPENDECTOMY  07/14/2016    Appendectomy    CATARACT EXTRACTION  07/14/2016    Cataract Surgery    OTHER SURGICAL HISTORY  07/14/2016    Laser Suite Retina Laser Treatment        Social History  Lives at home  Independent and ambulatory  No tobacco  History of social alcohol use - quit  No drug use  Employed    Family History  Mother with diabetes mellitus, kidney disease, Alzheimer's dementia.  He is from complications of kidney disease at 78 years of age.  Father is living.  2 sisters, healthy.  No children.     Allergies  Dye    Review of Systems   Constitutional: Negative.    HENT: Negative.     Eyes: Negative.    Respiratory: Negative.     Cardiovascular:  Positive for chest pain.   Gastrointestinal: Negative.    Endocrine: Negative.    Genitourinary: Negative.    Musculoskeletal: Negative.    Skin: Negative.    Allergic/Immunologic: Negative.    Neurological: Negative.    Hematological: Negative.    Psychiatric/Behavioral: Negative.     All other systems reviewed and are negative.         Physical Exam  Vitals and nursing note reviewed.   Constitutional:       General: He is not in acute distress.     Appearance: Normal appearance. He is obese. He is not ill-appearing, toxic-appearing or diaphoretic.   HENT:      Head: Normocephalic and atraumatic.      Right Ear: Tympanic membrane normal.      Left Ear: Tympanic membrane normal.      Nose: Nose normal.      Mouth/Throat:      Mouth: Mucous membranes  "are moist.      Pharynx: Oropharynx is clear.   Eyes:      Extraocular Movements: Extraocular movements intact.      Conjunctiva/sclera: Conjunctivae normal.      Pupils: Pupils are equal, round, and reactive to light.   Cardiovascular:      Rate and Rhythm: Normal rate and regular rhythm.      Pulses: Normal pulses.      Heart sounds: Normal heart sounds.   Pulmonary:      Effort: Pulmonary effort is normal. No respiratory distress.      Breath sounds: Normal breath sounds. No wheezing, rhonchi or rales.      Comments: Room air.  Abdominal:      General: Bowel sounds are normal. There is no distension.      Palpations: Abdomen is soft.      Tenderness: There is abdominal tenderness.      Comments: Epigastric tenderness.   Genitourinary:     Comments: Deferred.  Musculoskeletal:         General: No swelling or tenderness. Normal range of motion.      Cervical back: Normal range of motion and neck supple.   Skin:     General: Skin is warm.      Capillary Refill: Capillary refill takes less than 2 seconds.   Neurological:      General: No focal deficit present.      Mental Status: He is alert and oriented to person, place, and time.   Psychiatric:         Mood and Affect: Mood normal.         Behavior: Behavior normal.       Last Recorded Vitals  Blood pressure 131/60, pulse 73, temperature 37.1 °C (98.8 °F), temperature source Oral, resp. rate 18, height 1.727 m (5' 8\"), weight 88.5 kg (195 lb), SpO2 92 %.    Relevant Results  Results for orders placed or performed during the hospital encounter of 04/09/24 (from the past 24 hour(s))   CBC and Auto Differential   Result Value Ref Range    WBC 9.7 4.4 - 11.3 x10*3/uL    nRBC 0.0 0.0 - 0.0 /100 WBCs    RBC 4.98 4.50 - 5.90 x10*6/uL    Hemoglobin 14.3 13.5 - 17.5 g/dL    Hematocrit 44.9 41.0 - 52.0 %    MCV 90 80 - 100 fL    MCH 28.7 26.0 - 34.0 pg    MCHC 31.8 (L) 32.0 - 36.0 g/dL    RDW 12.4 11.5 - 14.5 %    Platelets 266 150 - 450 x10*3/uL    Neutrophils % 64.8 40.0 - " 80.0 %    Immature Granulocytes %, Automated 0.4 0.0 - 0.9 %    Lymphocytes % 22.5 13.0 - 44.0 %    Monocytes % 10.6 2.0 - 10.0 %    Eosinophils % 1.3 0.0 - 6.0 %    Basophils % 0.4 0.0 - 2.0 %    Neutrophils Absolute 6.29 1.20 - 7.70 x10*3/uL    Immature Granulocytes Absolute, Automated 0.04 0.00 - 0.70 x10*3/uL    Lymphocytes Absolute 2.18 1.20 - 4.80 x10*3/uL    Monocytes Absolute 1.03 (H) 0.10 - 1.00 x10*3/uL    Eosinophils Absolute 0.13 0.00 - 0.70 x10*3/uL    Basophils Absolute 0.04 0.00 - 0.10 x10*3/uL   Comprehensive metabolic panel   Result Value Ref Range    Glucose 277 (H) 65 - 99 mg/dL    Sodium 138 133 - 145 mmol/L    Potassium 4.2 3.4 - 5.1 mmol/L    Chloride 100 97 - 107 mmol/L    Bicarbonate 27 24 - 31 mmol/L    Urea Nitrogen 16 8 - 25 mg/dL    Creatinine 1.10 0.40 - 1.60 mg/dL    eGFR 78 >60 mL/min/1.73m*2    Calcium 9.1 8.5 - 10.4 mg/dL    Albumin 4.1 3.5 - 5.0 g/dL    Alkaline Phosphatase 105 35 - 125 U/L    Total Protein 6.6 5.9 - 7.9 g/dL    AST 21 5 - 40 U/L    Bilirubin, Total 0.9 0.1 - 1.2 mg/dL    ALT 52 (H) 5 - 40 U/L    Anion Gap 11 <=19 mmol/L   Lipase   Result Value Ref Range    Lipase 20 16 - 63 U/L   Serial Troponin, Initial ([facility])   Result Value Ref Range    Troponin T, High Sensitivity 16 (HH) <=14 ng/L   Serial Troponin, 2 Hour ([facility])   Result Value Ref Range    Troponin T, High Sensitivity 14 <=14 ng/L   POCT GLUCOSE   Result Value Ref Range    POCT Glucose 252 (H) 74 - 99 mg/dL   Serial Troponin, 6 Hour ([facility])   Result Value Ref Range    Troponin T, High Sensitivity 15 (HH) <=14 ng/L   POCT GLUCOSE   Result Value Ref Range    POCT Glucose 234 (H) 74 - 99 mg/dL     No results found for the last 90 days.    XR chest 1 view    Result Date: 4/9/2024  Interpreted By:  Bill Lozano, STUDY: XR CHEST 1 VIEW;  4/9/2024 6:44 am   INDICATION: Signs/Symptoms:Chest pain.   COMPARISON: Chest radiography dated 03/06/2024. correlation also made to CT chest of 10/06/2020   ACCESSION NUMBER(S):  [id number]   ORDERING CLINICIAN: AUDREY RASMUSSEN   FINDINGS: AP radiograph of the chest was provided.   Leads overlie the chest, partially obscuring the field-of-view   CARDIOMEDIASTINAL SILHOUETTE: Cardiomediastinal silhouette is normal in size and configuration.   LUNGS: Lungs appear clear. No pleural effusion or pneumothorax.   ABDOMEN: No remarkable upper abdominal findings.   BONES: No acute osseous changes.       1.  No evidence of acute cardiopulmonary process.       MACRO: None   Signed by: Bill Lozano 4/9/2024 6:56 AM Dictation workstation:   [id number]     Scheduled medications  aspirin, 81 mg, oral, Daily  atorvastatin, 80 mg, oral, Nightly  insulin glargine, 50 Units, subcutaneous, BID  insulin lispro, 0-10 Units, subcutaneous, TID with meals  losartan, 25 mg, oral, Daily  pantoprazole, 40 mg, intravenous, Daily      Continuous medications     PRN medications  PRN medications: acetaminophen **OR** acetaminophen **OR** acetaminophen, dextrose, glucagon, nitroglycerin, ondansetron **OR** ondansetron      ASSESSMENT:  Chest pain  Epigastric pain  Hypertension  Obesity  Type 2 diabetes mellitus  Chronic knee pain      PLAN:  Cardiology consultation.  Telemetry.  Continue Losartan 25 mg p.o. daily.  Monitor blood pressure.  Labs reviewed.  AST 21.  ALT 52.  Alk Phosphatase 105.  Total Bilirubin 0.9.  Lipase 20.  There is a very slight upper abdominal epigastric tenderness on examination.  Add PPI Protonix IV.  Avoid NSAIDs and alcohol.  Monitor symptoms.  Imaging if symptoms persist, otherwise outpatient follow-up.  Diet, weight loss, exercise education.  Hemoglobin A1c 5.4 3/6/2024.  Monitor blood glucose AC/HS.  Continue insulin.  Adjust insulin as needed for glycemic control.  Supportive care.  Patient reassured.  Case management consultation for discharge planning.  We will take DVT, fall, aspiration and decubitus precautions during his stay in the hospital.  The plan has been discussed with the patient in  detail.  He is agreeable.  Orders written per Dr. Lopez.  Any additions or modifications at his discretion.      Jeniffer Mcclendon, HERMILA-CNP

## 2024-04-09 NOTE — PROGRESS NOTES
Pharmacy Medication History Review    Kevin L Bosworth is a 58 y.o. male admitted for Chest pain. Pharmacy reviewed the patient's [id number] medications and allergies for accuracy.    Medications ADDED:  none  Medications CHANGED:  none  Medications REMOVED:   Glucosamine 500mg    The list below reflects the updated PTA list. Comments regarding how patient may be taking medications differently can be found in the Admit Orders Activity  Prior to Admission Medications   Prescriptions Last Dose Informant   Dexcom G4 platinum transmitter (Dexcom G6 Transmitter) device  self   Sig: INSERT INTO SENSOR EVERY 10 DAYS REPLACE ONCE EVERY 90 DAYS 30 DAYS   aspirin 81 mg EC tablet  self   Sig: Take 1 tablet (81 mg) by mouth once daily.   aspirin 81 mg chewable tablet  self   Sig: Chew 1 tablet (81 mg) once daily. Do not start before 2024.   blood-glucose sensor (Dexcom G6 Sensor) device  self   Sig: APPLY NEW SENSOR ONCE EVERY 10 DAYS FOR CONTINUOUS GLUCOSE MONITORING 30 DAYS   glucagon (Gvoke HypoPen 2-Pack) 1 mg/0.2 mL auto-injector  self   Sig: Inject as directed for severe low blood sugar. Repeat in 15 minutes if needed. Subcutaneous for 30 days   ibuprofen 200 mg tablet  self   Sig: Take 4 tablets (800 mg) by mouth every 6 hours if needed.   insulin glargine (Lantus U-100 Insulin) 100 unit/mL injection  self   Sig: Inject 100 Units under the skin once daily at bedtime. Take as directed per insulin instructions.   insulin lispro (HumaLOG KwikPen Insulin) 100 unit/mL injection  self   Si UNITS SUBCUTANEOUS THREE TIMES A DAY WITH MEAL 90 DAYS   losartan (Cozaar) 25 mg tablet  self   Sig: Take 1 tablet (25 mg) by mouth once daily. Do not start before 2024.      Facility-Administered Medications: None        The list below reflects the updated allergy list. Please review each documented allergy for additional clarification and justification.  Allergies  Reviewed by Krysten Berg on 2024  "       Severity Reactions Comments    Dye Low Rash FA ,\"feels faintish\"            Pharmacy has been updated to [facility].    Sources used to complete the med history include dispense history, PTA medication list, patient interview. Patient is a good historian.    Below are additional concerns with the patient's PTA list.  None.     Krysten Berg  Please reach out via [employer] Secure Chat for questions    "

## 2024-04-09 NOTE — NURSING NOTE
Patient arrived to room. Greeted patient, introduced myself, explained my role and his plan of care thus far. No complaints or concerns communicated. No injuries, bleeding or distress noted. Patient ambulated from miller to bed. Gait steady and balanced. Respirations even & unlabored. Instructed patient on use of nurse call button, bed controls, TV and phone. PCNA to room for vital signs.   Universal Safety Interventions Fall with Harm Risk

## 2024-04-09 NOTE — LETTER
April 9, 2024     Patient: Kevin L Bosworth   YOB: 1965   Date of Visit: 4/9/2024       To Whom It May Concern:    Kevin Bosworth was seen in the hospital on 4/9/2024.  He can return to work on 4/11/2024 without restrictions.    If you have any questions or concerns, please don't hesitate to call.         Sincerely,         Dr. Abelardo Lopez MD.

## 2024-04-10 ENCOUNTER — PHARMACY VISIT (OUTPATIENT)
Dept: PHARMACY | Facility: CLINIC | Age: 59
End: 2024-04-10
Payer: MEDICAID

## 2024-04-10 PROCEDURE — RXMED WILLOW AMBULATORY MEDICATION CHARGE

## 2024-04-10 NOTE — CONSULTS
Consults  History Of Present Illness:    Kevin L Bosworth is a 58 y.o. male presenting with  with chest pain.  He was in his usual state of health until this morning around 2 AM.  He was sleeping when he woke up with chest pain.  He reports a pressure-like pain in the lower chest non-radiating without notable aggravating relieving factors.  911 was called.  He took Nitroglycerin and Aspirin without relief.  He presented to the emergency department.  He reports mid upper abdominal discomfort upon palpation by the provider in the emergency department with associated nausea.  He denies any vomiting or diarrhea.  He states that he fell asleep in the emergency department and upon awakening, the chest pain had resolved.  He reports a similar episode in March 2024.  At that time, he developed the same chest pain while sitting on the couch.  He treated with Nitroglycerin with relief.  He presented to the emergency department and was observed.  He was discharged home.  He followed up outpatient with his primary care provider and was advised to schedule an appointment with a Cardiologist.  He has not yet scheduled an appointment with Cardiology.  He states that he is unable to ambulate on a treadmill due to chronic knee pain.  He also reports an allergy to dye.  Since the episode in March 2024, he has been taking Aspirin 81 mg p.o. daily and was also started on Losartan for hypertension.  He was taking Ibuprofen daily for knee pain however he stopped taking Ibuprofen daily weeks ago as he was advised not to take ibuprofen with Losartan per his primary care provider.  In the emergency department, initial workup was done.  CMP showed an AST 21.  ALT 52.  Alk phosphatase 105.  Lipase 20.  CBC was unremarkable.  Troponin 16, 14, 15.  Chest x-ray per radiology showed nothing acute.  12 lead EKG showed normal sinus rhythm, nonspecific ST-T wave changes, no acute ischemia.  He was treated in the emergency department and was  admitted.  Patient informs me that he cannot able to walk on a treadmill secondary to severe knee pains in both lower extremities.  He is not  he has no children.  He has 2 sisters are reasonably healthy.  Patient lives in [facility] and follows with Dr. England in [place].  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  Patient has a history of smoking or alcohol use.     Last Recorded Vitals:  Vitals:    04/09/24 0947 04/09/24 1122 04/09/24 1230 04/09/24 1626   BP: 139/70  131/60 140/65   BP Location: Left arm  Left arm Left arm   Patient Position: Lying  Lying Lying   Pulse: 73  73 74   Resp: 17  18 18   Temp: 36.9 °C (98.4 °F)  37.1 °C (98.8 °F) 37.2 °C (99 °F)   TempSrc: Oral  Oral Oral   SpO2: 91% 93% 92% 93%   Weight:       Height:           Last Labs:  CBC - 4/9/2024:  4:24 AM  9.7 14.3 266    44.9      CMP - 4/9/2024:  4:24 AM  9.1 6.6 21 --- 0.9   _ 4.1 52 105      PTT - No results in last year.  _   _ _     POC HEMOGLOBIN A1c   Date/Time Value Ref Range Status   01/29/2024 03:28 PM 5.4 4.2 - 6.5 % Final     Hemoglobin A1C   Date/Time Value Ref Range Status   03/07/2024 01:58 AM   Corrected     Comment:     Probable IV contamination, recollect requested.Bad sample used, result unreliable.  Corrected result: Previously reported as 7.2 % (reference range: See below %) on 3/7/2024 at 0311 EST.   11/11/2021 04:20 AM 6.9 (H) 4.0 - 6.0 % Final     Comment:     Hemoglobin A1C levels are related to mean blood glucose during the   preceding 2-3 months. The relationship table below may be used as a   general guide. Each 1% increase in HGB A1C is a reflection of an   increase in mean glucose of approximately 30 mg/dl.   Reference: Diabetes Care, volume 29, supplement 1 Jan. 2006                        HGB A1C ................. Approx. Mean Glucose   _______________________________________________   6%    "...............................  120 mg/dl   7%   ...............................  150 mg/dl   8%   ...............................  180 mg/dl   9%   ...............................  210 mg/dl   10%  ...............................  240 mg/dl  Performed at [address] [address]     11/10/2021 07:48 PM 6.8 (H) 4.0 - 6.0 % Final     Comment:     Hemoglobin A1C levels are related to mean blood glucose during the   preceding 2-3 months. The relationship table below may be used as a   general guide. Each 1% increase in HGB A1C is a reflection of an   increase in mean glucose of approximately 30 mg/dl.   Reference: Diabetes Care, volume 29, supplement 1 Jan. 2006                        HGB A1C ................. Approx. Mean Glucose   _______________________________________________   6%   ...............................  120 mg/dl   7%   ...............................  150 mg/dl   8%   ...............................  180 mg/dl   9%   ...............................  210 mg/dl   10%  ...............................  240 mg/dl  Performed at [address] [place] [address]       LDL Calculated   Date/Time Value Ref Range Status   11/18/2019 12:00  65 - 130 MG/DL Final      Last I/O:  I/O last 3 completed shifts:  In: 775 (8.8 mL/kg) [P.O.:775]  Out: - (0 mL/kg)   Weight: 88.5 kg     Past Cardiology Tests (Last 3 Years):  EKG:  ECG 12 lead 03/07/2024 (Preliminary)      ECG 12 lead 03/06/2024    Echo:  No results found for this or any previous visit from the past 1095 days.    Ejection Fractions:  No results found for: \"EF\"  Cath:  No results found for this or any previous visit from the past 1095 days.    Stress Test:  No results found for this or any previous visit from the past 1095 days.    Cardiac Imaging:  No results found for this or any previous visit from the past 1095 days.      Past Medical History:  He has a past medical history of Personal history of other diseases of " the circulatory system, Personal history of other endocrine, nutritional and metabolic disease, and Personal history of other endocrine, nutritional and metabolic disease.    Past Surgical History:  He has a past surgical history that includes Cataract extraction (07/14/2016); Appendectomy (07/14/2016); and Other surgical history (07/14/2016).      Social History:  He reports that he has never smoked. He has never used smokeless tobacco. He reports that he does not drink alcohol and does not use drugs.    Family History:  No family history on file.     Allergies:  Dye    Inpatient Medications:  Scheduled medications   Medication Dose Route Frequency    aspirin  81 mg oral Daily    atorvastatin  80 mg oral Nightly    insulin glargine  50 Units subcutaneous BID    insulin lispro  0-10 Units subcutaneous TID with meals    losartan  25 mg oral Daily    pantoprazole  40 mg intravenous Daily     PRN medications   Medication    acetaminophen    Or    acetaminophen    Or    acetaminophen    dextrose    glucagon    nitroglycerin    ondansetron    Or    ondansetron     Continuous Medications   Medication Dose Last Rate     Outpatient Medications:  Current Outpatient Medications   Medication Instructions    aspirin 81 mg, oral, Daily    atorvastatin (LIPITOR) 20 mg, oral, Daily    blood-glucose sensor (Dexcom G6 Sensor) device APPLY NEW SENSOR ONCE EVERY 10 DAYS FOR CONTINUOUS GLUCOSE MONITORING 30 DAYS    Dexcom G4 platinum transmitter (Dexcom G6 Transmitter) device INSERT INTO SENSOR EVERY 10 DAYS REPLACE ONCE EVERY 90 DAYS 30 DAYS    glucagon (Gvoke HypoPen 2-Pack) 1 mg/0.2 mL auto-injector Inject as directed for severe low blood sugar. Repeat in 15 minutes if needed. Subcutaneous for 30 days    insulin lispro (HumaLOG KwikPen Insulin) 100 unit/mL injection 20 UNITS SUBCUTANEOUS THREE TIMES A DAY WITH MEAL 90 DAYS    Lantus U-100 Insulin 100 Units, subcutaneous, Nightly, Take as directed per insulin instructions.     losartan (COZAAR) 25 mg, oral, Daily    pantoprazole (PROTONIX) 40 mg, oral, 2 times daily, Do not crush, chew, or split.       Physical Exam:  HEENT PERRLA neck is supple lungs clear to auscultation bilaterally with good air entry heart S1-S2 present no murmurs abdomen soft and tender over the mid epigastric area.  Bowel sounds are present extremity shows no evidence of pedal edema he does have a rectus sheath ventral hernia     Assessment/Plan   #1 atypical chest pain his symptoms are quite suggestive the patient probably has underlying gastritis.  Patient was initiated on proton pump inhibitors by Ms. Mcclendon from Dr. John mgaana.  History of troponins are flat in trend and does not appear acute coronary syndrome.  His EKG shows no acute ST segment changes.  His telemetry has been unremarkable.  Patient discharged home from cardiac [facility] and I will see him in follow-up and will keep Dr. England informed of his cardiovascular progress.       Code Status:  Full Code    I spent 35 minutes in the professional and overall care of this patient.        J Luis Baca MD

## 2024-04-13 LAB
ATRIAL RATE: 71 BPM
P AXIS: 58 DEGREES
P OFFSET: 196 MS
P ONSET: 148 MS
PR INTERVAL: 142 MS
Q ONSET: 219 MS
QRS COUNT: 12 BEATS
QRS DURATION: 80 MS
QT INTERVAL: 390 MS
QTC CALCULATION(BAZETT): 423 MS
QTC FREDERICIA: 412 MS
R AXIS: 57 DEGREES
T AXIS: 55 DEGREES
T OFFSET: 414 MS
VENTRICULAR RATE: 71 BPM

## 2024-05-18 DIAGNOSIS — I10 PRIMARY HYPERTENSION: ICD-10-CM

## 2024-05-18 DIAGNOSIS — E10.3523: ICD-10-CM

## 2024-05-18 DIAGNOSIS — K21.9 GASTROESOPHAGEAL REFLUX DISEASE WITHOUT ESOPHAGITIS: ICD-10-CM

## 2024-05-18 DIAGNOSIS — R07.9 CHEST PAIN, UNSPECIFIED TYPE: ICD-10-CM

## 2024-05-18 DIAGNOSIS — E11.9 DIABETES MELLITUS WITHOUT COMPLICATION (MULTI): ICD-10-CM

## 2024-05-22 RX ORDER — LOSARTAN POTASSIUM 25 MG/1
25 TABLET ORAL DAILY
Qty: 30 TABLET | Refills: 0 | Status: SHIPPED | OUTPATIENT
Start: 2024-05-22 | End: 2024-06-21

## 2024-05-22 RX ORDER — INSULIN LISPRO 100 [IU]/ML
20 INJECTION, SOLUTION INTRAVENOUS; SUBCUTANEOUS
Qty: 54 ML | Refills: 0 | Status: SHIPPED | OUTPATIENT
Start: 2024-05-22 | End: 2024-08-20

## 2024-05-22 RX ORDER — PANTOPRAZOLE SODIUM 40 MG/1
40 TABLET, DELAYED RELEASE ORAL 2 TIMES DAILY
Qty: 60 TABLET | Refills: 1 | Status: SHIPPED | OUTPATIENT
Start: 2024-05-22 | End: 2024-07-21

## 2024-05-22 RX ORDER — ATORVASTATIN CALCIUM 20 MG/1
20 TABLET, FILM COATED ORAL DAILY
Qty: 30 TABLET | Refills: 1 | Status: SHIPPED | OUTPATIENT
Start: 2024-05-22 | End: 2024-07-21

## 2024-06-04 ENCOUNTER — PATIENT OUTREACH (OUTPATIENT)
Dept: PRIMARY CARE | Facility: CLINIC | Age: 59
End: 2024-06-04
Payer: COMMERCIAL

## 2024-06-04 NOTE — PROGRESS NOTES
Patient has met target of no readmission for (90) days post (hospital, SNF, rehab) discharge and is graduated from Transitional Care Management program at this time.    Jarred Milligan LPN

## 2024-06-14 DIAGNOSIS — I10 PRIMARY HYPERTENSION: ICD-10-CM

## 2024-06-14 RX ORDER — LOSARTAN POTASSIUM 25 MG/1
25 TABLET ORAL DAILY
Qty: 30 TABLET | Refills: 0 | Status: SHIPPED | OUTPATIENT
Start: 2024-06-14 | End: 2024-07-14

## 2024-07-17 DIAGNOSIS — E11.9 DIABETES MELLITUS WITHOUT COMPLICATION (MULTI): ICD-10-CM

## 2024-07-17 DIAGNOSIS — R07.9 CHEST PAIN, UNSPECIFIED TYPE: ICD-10-CM

## 2024-07-17 DIAGNOSIS — E11.9 TYPE 2 DIABETES MELLITUS TREATED WITHOUT INSULIN (MULTI): ICD-10-CM

## 2024-07-17 DIAGNOSIS — I10 PRIMARY HYPERTENSION: ICD-10-CM

## 2024-07-17 DIAGNOSIS — K21.9 GASTROESOPHAGEAL REFLUX DISEASE WITHOUT ESOPHAGITIS: ICD-10-CM

## 2024-07-17 DIAGNOSIS — E10.3523: ICD-10-CM

## 2024-07-17 NOTE — TELEPHONE ENCOUNTER
PATIENT IS VERY LOW ON INSULINS  Last seen: 03/26/2024   Last A1C:  5.4 on 01/26/2024   Last Lipid: 01/29/2024

## 2024-07-18 RX ORDER — BLOOD-GLUCOSE SENSOR
1 EACH MISCELLANEOUS
Qty: 3 EACH | Refills: 3 | Status: SHIPPED | OUTPATIENT
Start: 2024-07-18

## 2024-07-18 RX ORDER — ATORVASTATIN CALCIUM 20 MG/1
20 TABLET, FILM COATED ORAL DAILY
Qty: 30 TABLET | Refills: 1 | Status: SHIPPED | OUTPATIENT
Start: 2024-07-18 | End: 2024-09-16

## 2024-07-18 RX ORDER — INSULIN LISPRO 100 [IU]/ML
20 INJECTION, SOLUTION INTRAVENOUS; SUBCUTANEOUS
Qty: 54 ML | Refills: 0 | Status: SHIPPED | OUTPATIENT
Start: 2024-07-18 | End: 2024-10-16

## 2024-07-18 RX ORDER — BLOOD-GLUCOSE TRANSMITTER
EACH MISCELLANEOUS
Qty: 1 EACH | Refills: 2 | Status: SHIPPED | OUTPATIENT
Start: 2024-07-18

## 2024-07-18 RX ORDER — PANTOPRAZOLE SODIUM 40 MG/1
40 TABLET, DELAYED RELEASE ORAL 2 TIMES DAILY
Qty: 60 TABLET | Refills: 1 | Status: SHIPPED | OUTPATIENT
Start: 2024-07-18 | End: 2024-09-16

## 2024-07-18 RX ORDER — LOSARTAN POTASSIUM 25 MG/1
25 TABLET ORAL DAILY
Qty: 30 TABLET | Refills: 0 | Status: SHIPPED | OUTPATIENT
Start: 2024-07-18 | End: 2024-08-17

## 2024-07-18 RX ORDER — INSULIN GLARGINE 100 [IU]/ML
100 INJECTION, SOLUTION SUBCUTANEOUS NIGHTLY
Qty: 90 ML | Refills: 1 | Status: SHIPPED | OUTPATIENT
Start: 2024-07-18 | End: 2025-01-14

## 2024-07-18 NOTE — TELEPHONE ENCOUNTER
Last OV 3/26  No future OV scheduled  Last refill 11/12  #1, 2 refills  Pt needs to be seen - Last A1C 3/7 was contaminated and recollect was requested.  Left message for patient to call office

## 2024-07-23 RX ORDER — BLOOD-GLUCOSE SENSOR
EACH MISCELLANEOUS
Qty: 1 EACH | Refills: 6 | OUTPATIENT
Start: 2024-07-23

## 2024-07-25 ENCOUNTER — TELEPHONE (OUTPATIENT)
Dept: PRIMARY CARE | Facility: CLINIC | Age: 59
End: 2024-07-25
Payer: COMMERCIAL

## 2024-07-25 NOTE — TELEPHONE ENCOUNTER
Patient requesting to go back on Wellbutrin, he stopped taking it on his own and feels like he needs to go back on this med asap. He is not sure of the dosing. [facility] [place].

## 2024-07-26 DIAGNOSIS — F32.89 OTHER DEPRESSION: Primary | ICD-10-CM

## 2024-07-26 RX ORDER — BUPROPION HYDROCHLORIDE 150 MG/1
150 TABLET ORAL EVERY MORNING
Qty: 30 TABLET | Refills: 1 | Status: SHIPPED | OUTPATIENT
Start: 2024-07-26 | End: 2024-09-24

## 2024-08-19 ENCOUNTER — TELEPHONE (OUTPATIENT)
Dept: PRIMARY CARE | Facility: CLINIC | Age: 59
End: 2024-08-19
Payer: COMMERCIAL

## 2024-08-19 NOTE — TELEPHONE ENCOUNTER
Refill request for 90 day supply of Wellbutrin 150 mg sent over from [facility].  Pt had 30 days sent in with 1 refill, and needs to see Dr England for follow up either in person or a virtual visit.  Left message for pt to call back and schedule.

## 2024-09-27 ENCOUNTER — OFFICE VISIT (OUTPATIENT)
Dept: PRIMARY CARE | Facility: CLINIC | Age: 59
End: 2024-09-27
Payer: COMMERCIAL

## 2024-09-27 VITALS
BODY MASS INDEX: 30.96 KG/M2 | HEART RATE: 86 BPM | DIASTOLIC BLOOD PRESSURE: 74 MMHG | WEIGHT: 203.6 LBS | SYSTOLIC BLOOD PRESSURE: 142 MMHG

## 2024-09-27 DIAGNOSIS — E78.00 HIGH CHOLESTEROL: ICD-10-CM

## 2024-09-27 DIAGNOSIS — Z79.4 TYPE 2 DIABETES MELLITUS WITH MILD NONPROLIFERATIVE RETINOPATHY WITHOUT MACULAR EDEMA, WITH LONG-TERM CURRENT USE OF INSULIN, UNSPECIFIED LATERALITY: Primary | ICD-10-CM

## 2024-09-27 DIAGNOSIS — E11.3299 TYPE 2 DIABETES MELLITUS WITH MILD NONPROLIFERATIVE RETINOPATHY WITHOUT MACULAR EDEMA, WITH LONG-TERM CURRENT USE OF INSULIN, UNSPECIFIED LATERALITY: Primary | ICD-10-CM

## 2024-09-27 DIAGNOSIS — K21.9 GASTROESOPHAGEAL REFLUX DISEASE WITHOUT ESOPHAGITIS: ICD-10-CM

## 2024-09-27 DIAGNOSIS — R07.9 CHEST PAIN, UNSPECIFIED TYPE: ICD-10-CM

## 2024-09-27 DIAGNOSIS — E10.3523 TYPE 1 DIABETES MELLITUS WITH BOTH EYES AFFECTED BY PROLIFERATIVE RETINOPATHY AND TRACTION RETINAL DETACHMENTS INVOLVING MACULAE: ICD-10-CM

## 2024-09-27 DIAGNOSIS — I10 PRIMARY HYPERTENSION: ICD-10-CM

## 2024-09-27 DIAGNOSIS — F32.89 OTHER DEPRESSION: ICD-10-CM

## 2024-09-27 PROBLEM — E11.319 TYPE 2 DIABETES MELLITUS WITH RETINOPATHY, WITH LONG-TERM CURRENT USE OF INSULIN: Status: RESOLVED | Noted: 2023-11-27 | Resolved: 2024-09-27

## 2024-09-27 LAB — POC HEMOGLOBIN A1C: 5.6 % (ref 4.2–6.5)

## 2024-09-27 PROCEDURE — 3077F SYST BP >= 140 MM HG: CPT | Performed by: FAMILY MEDICINE

## 2024-09-27 PROCEDURE — 3078F DIAST BP <80 MM HG: CPT | Performed by: FAMILY MEDICINE

## 2024-09-27 PROCEDURE — 4010F ACE/ARB THERAPY RXD/TAKEN: CPT | Performed by: FAMILY MEDICINE

## 2024-09-27 PROCEDURE — 83036 HEMOGLOBIN GLYCOSYLATED A1C: CPT | Mod: QW | Performed by: FAMILY MEDICINE

## 2024-09-27 PROCEDURE — 1036F TOBACCO NON-USER: CPT | Performed by: FAMILY MEDICINE

## 2024-09-27 PROCEDURE — 99214 OFFICE O/P EST MOD 30 MIN: CPT | Performed by: FAMILY MEDICINE

## 2024-09-27 RX ORDER — BUPROPION HYDROCHLORIDE 300 MG/1
300 TABLET ORAL EVERY MORNING
Qty: 30 TABLET | Refills: 1 | Status: SHIPPED | OUTPATIENT
Start: 2024-09-27 | End: 2025-03-26

## 2024-09-27 RX ORDER — ATORVASTATIN CALCIUM 20 MG/1
20 TABLET, FILM COATED ORAL DAILY
Qty: 90 TABLET | Refills: 1 | Status: SHIPPED | OUTPATIENT
Start: 2024-09-27 | End: 2025-03-26

## 2024-09-27 RX ORDER — LOSARTAN POTASSIUM 25 MG/1
25 TABLET ORAL DAILY
Qty: 90 TABLET | Refills: 1 | Status: SHIPPED | OUTPATIENT
Start: 2024-09-27 | End: 2025-03-26

## 2024-09-27 RX ORDER — PANTOPRAZOLE SODIUM 40 MG/1
40 TABLET, DELAYED RELEASE ORAL 2 TIMES DAILY
Qty: 90 TABLET | Refills: 1 | Status: SHIPPED | OUTPATIENT
Start: 2024-09-27 | End: 2024-11-26

## 2024-09-27 ASSESSMENT — PATIENT HEALTH QUESTIONNAIRE - PHQ9
SUM OF ALL RESPONSES TO PHQ9 QUESTIONS 1 AND 2: 0
1. LITTLE INTEREST OR PLEASURE IN DOING THINGS: NOT AT ALL
2. FEELING DOWN, DEPRESSED OR HOPELESS: NOT AT ALL

## 2024-09-27 ASSESSMENT — PAIN SCALES - GENERAL: PAINLEVEL: 9

## 2024-09-27 NOTE — PATIENT INSTRUCTIONS
Continue current medications.   Continue to work with orthopedics.   Let me know how it goes on the wellbutrin over the next two weeks with cravings.   Send in the cologuard.  Add metamucil or citrocel, 1 spoonful with water once a day.

## 2024-09-27 NOTE — PROGRESS NOTES
Subjective   Patient ID: Kevin L Bosworth is a 59 y.o. male who presents for Diabetes and Med Check.    HPI   He presents with follow-up for his chronic medical conditions.  Hemoglobin A1c is now less than 6 still.  It was 5.4-5.6.  He is really had is under excellent control.    He is working with orthopedics right now as he has the knee pain.  His orthopedic surgeon set up.  There is nothing wrong with his knee, he states, but he is going to have an MRI of that knee.  He is unable to exercise because of the pain that he has.  Hurts worse going up and down stairs.    He would like to talk again about the Wellbutrin.  He is darted this again because it has helped him quit drinking at least cut way back in the past.  He is taking 150 mg dose and he notices that does not have him cutting back as much.  He does not really want to stop drinking completely but he just does not want to crave it and just wants to drink it here and there.    Thanks bit more depression lately.    States his stools are little softer or least more messy with wiping now.  Wondering what he can do with that.  Has not yet done the Cologuard testing.    Review of Systems    Objective   /74   Pulse 86   Wt 92.4 kg (203 lb 9.6 oz)   BMI 30.96 kg/m²     Physical Exam    Assessment/Plan   Diagnoses and all orders for this visit:  Type 2 diabetes mellitus with mild nonproliferative retinopathy without macular edema, with long-term current use of insulin, unspecified laterality (Multi)  -     POCT glycosylated hemoglobin (Hb A1C) manually resulted  Chest pain, unspecified type  -     atorvastatin (Lipitor) 20 mg tablet; Take 1 tablet (20 mg) by mouth once daily.  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  Other depression  -     buPROPion XL (Wellbutrin XL) 300 mg  24 hr tablet; Take 1 tablet (300 mg) by mouth once daily in the morning. Do not crush, chew, or split.  Primary hypertension  -     losartan (Cozaar) 25 mg tablet; Take 1 tablet (25 mg) by mouth once daily.  Gastroesophageal reflux disease without esophagitis  -     pantoprazole (ProtoNix) 40 mg EC tablet; Take 1 tablet (40 mg) by mouth 2 times a day. Do not crush, chew, or split.  Sugar is very well-controlled right now.  He will continue his current medications.  I recommended he add a fiber supplement such as Metamucil or Citrucel, and see how his stools do with that.  Will do the Cologuard testing but if the stool does not change back to normal, we could consider colonoscopy.    Will increase the Wellbutrin to 300 mg and we will see how he does with his cravings on that.  That should help raise his dopamine level and we can help with this.  He will let me know how he does over the next 2 weeks.  No follow-up with me in 3 to 6 months for the diabetes but earlier if this medication is not working for him.

## 2024-10-09 ENCOUNTER — HOSPITAL ENCOUNTER (OUTPATIENT)
Dept: RADIOLOGY | Facility: HOSPITAL | Age: 59
Discharge: HOME | End: 2024-10-09
Payer: COMMERCIAL

## 2024-10-09 DIAGNOSIS — M17.12 UNILATERAL PRIMARY OSTEOARTHRITIS, LEFT KNEE: ICD-10-CM

## 2024-10-09 PROCEDURE — 73721 MRI JNT OF LWR EXTRE W/O DYE: CPT | Mod: LT

## 2024-10-09 PROCEDURE — 73721 MRI JNT OF LWR EXTRE W/O DYE: CPT | Mod: LEFT SIDE | Performed by: RADIOLOGY

## 2024-10-22 DIAGNOSIS — F32.89 OTHER DEPRESSION: ICD-10-CM

## 2024-10-22 RX ORDER — BUPROPION HYDROCHLORIDE 300 MG/1
300 TABLET ORAL EVERY MORNING
Qty: 90 TABLET | Refills: 1 | Status: SHIPPED | OUTPATIENT
Start: 2024-10-22 | End: 2025-04-20

## 2024-10-22 NOTE — TELEPHONE ENCOUNTER
LOV:   9/27/24        NEXT OV:   NOT SCHEDULED                         LAST FILL:    9/27/24 FOR 30 DAYS WITH 1 REFILL.  90 DAY REQUESTED                       LABS:

## 2024-11-08 ENCOUNTER — TELEPHONE (OUTPATIENT)
Dept: PRIMARY CARE | Facility: CLINIC | Age: 59
End: 2024-11-08
Payer: COMMERCIAL

## 2024-11-08 NOTE — TELEPHONE ENCOUNTER
No difference in the dose increase in the bupropion.  He is not seeing any significant difference.  He has been on the increased dose for about 3 weeks.  He said he is not sure what to do next.

## 2024-12-02 DIAGNOSIS — E11.9 DIABETES MELLITUS WITHOUT COMPLICATION (MULTI): ICD-10-CM

## 2024-12-02 RX ORDER — INSULIN LISPRO 100 [IU]/ML
20 INJECTION, SOLUTION INTRAVENOUS; SUBCUTANEOUS
Qty: 54 ML | Refills: 1 | Status: SHIPPED | OUTPATIENT
Start: 2024-12-02 | End: 2025-05-31

## 2024-12-02 NOTE — TELEPHONE ENCOUNTER
Last OV:   9/27/24  Next OV:   No future OV scheduled  Last Refill:   7/18/24  54 mL, no refills  Last Labs:     Last A1C  9/27/24  5.6

## 2024-12-26 DIAGNOSIS — K21.9 GASTROESOPHAGEAL REFLUX DISEASE WITHOUT ESOPHAGITIS: ICD-10-CM

## 2024-12-27 RX ORDER — PANTOPRAZOLE SODIUM 40 MG/1
40 TABLET, DELAYED RELEASE ORAL 2 TIMES DAILY
Qty: 180 TABLET | Refills: 1 | Status: SHIPPED | OUTPATIENT
Start: 2024-12-27 | End: 2025-06-25

## 2025-01-03 DIAGNOSIS — E11.9 DIABETES MELLITUS WITHOUT COMPLICATION (MULTI): ICD-10-CM

## 2025-01-03 RX ORDER — BLOOD-GLUCOSE SENSOR
1 EACH MISCELLANEOUS
Qty: 3 EACH | Refills: 3 | Status: SHIPPED | OUTPATIENT
Start: 2025-01-03

## 2025-01-03 NOTE — TELEPHONE ENCOUNTER
LOV:   9/27/24        NEXT OV:   Not yet scheduled but pt will call back as he wants to see you                         LAST FILL:      7/18/24                      LABS:

## 2025-02-07 ENCOUNTER — TELEPHONE (OUTPATIENT)
Dept: PRIMARY CARE | Facility: CLINIC | Age: 60
End: 2025-02-07
Payer: COMMERCIAL

## 2025-02-07 DIAGNOSIS — E11.3299 TYPE 2 DIABETES MELLITUS WITH MILD NONPROLIFERATIVE RETINOPATHY WITHOUT MACULAR EDEMA, WITH LONG-TERM CURRENT USE OF INSULIN, UNSPECIFIED LATERALITY: Primary | ICD-10-CM

## 2025-02-07 DIAGNOSIS — Z79.4 TYPE 2 DIABETES MELLITUS WITH MILD NONPROLIFERATIVE RETINOPATHY WITHOUT MACULAR EDEMA, WITH LONG-TERM CURRENT USE OF INSULIN, UNSPECIFIED LATERALITY: Primary | ICD-10-CM

## 2025-02-07 RX ORDER — BLOOD-GLUCOSE TRANSMITTER
EACH MISCELLANEOUS
Qty: 1 EACH | Refills: 3 | Status: SHIPPED | OUTPATIENT
Start: 2025-02-07

## 2025-02-07 NOTE — TELEPHONE ENCOUNTER
Patient called to request new transmitter.   I called pharmacy who advised he can only get one per year.  Patient picked up last transmitter in October.  Patient states this is not true and that he is allowed a new one.  Can you assist please?   Sensors are available for him to .

## 2025-02-14 DIAGNOSIS — E11.9 TYPE 2 DIABETES MELLITUS TREATED WITHOUT INSULIN (MULTI): ICD-10-CM

## 2025-02-14 RX ORDER — INSULIN GLARGINE 100 [IU]/ML
100 INJECTION, SOLUTION SUBCUTANEOUS NIGHTLY
Qty: 30 ML | Refills: 0 | Status: SHIPPED | OUTPATIENT
Start: 2025-02-14 | End: 2025-03-16

## 2025-02-24 DIAGNOSIS — E11.9 TYPE 2 DIABETES MELLITUS TREATED WITHOUT INSULIN (MULTI): ICD-10-CM

## 2025-02-24 DIAGNOSIS — E11.9 DIABETES MELLITUS WITHOUT COMPLICATION (MULTI): ICD-10-CM

## 2025-02-24 RX ORDER — INSULIN GLARGINE 100 [IU]/ML
100 INJECTION, SOLUTION SUBCUTANEOUS NIGHTLY
Qty: 30 ML | Refills: 0 | Status: SHIPPED | OUTPATIENT
Start: 2025-02-24 | End: 2025-03-26

## 2025-02-24 RX ORDER — INSULIN LISPRO 100 [IU]/ML
20 INJECTION, SOLUTION INTRAVENOUS; SUBCUTANEOUS
Qty: 54 ML | Refills: 0 | Status: SHIPPED | OUTPATIENT
Start: 2025-02-24 | End: 2025-08-23

## 2025-02-24 NOTE — TELEPHONE ENCOUNTER
Last seen: 09/27/2024  Upcoming Appt: n/a  Humalog Last Filled:    12/02/2024 #54ml Refills:1   Lantus Last filled: 02/14/2025 #30lm No refills     Patient is requesting lantus script be written for pens no vials

## 2025-04-03 DIAGNOSIS — E11.9 TYPE 2 DIABETES MELLITUS TREATED WITHOUT INSULIN (MULTI): ICD-10-CM

## 2025-04-03 NOTE — TELEPHONE ENCOUNTER
Pharmacy requesting refill of Lantus.  Sent Mychart message to pt to call the office and schedule his overdue 6 month follow up.  RX tab set for 30 day supply.  [facility] in [place]

## 2025-04-04 DIAGNOSIS — I10 PRIMARY HYPERTENSION: ICD-10-CM

## 2025-04-04 RX ORDER — INSULIN GLARGINE 100 [IU]/ML
100 INJECTION, SOLUTION SUBCUTANEOUS NIGHTLY
Qty: 30 ML | Refills: 0 | Status: SHIPPED | OUTPATIENT
Start: 2025-04-04

## 2025-04-09 RX ORDER — LOSARTAN POTASSIUM 25 MG/1
25 TABLET ORAL DAILY
Qty: 90 TABLET | Refills: 1 | Status: SHIPPED | OUTPATIENT
Start: 2025-04-09

## 2025-04-22 ENCOUNTER — APPOINTMENT (OUTPATIENT)
Dept: PRIMARY CARE | Facility: CLINIC | Age: 60
End: 2025-04-22
Payer: COMMERCIAL

## 2025-04-22 DIAGNOSIS — E11.9 DIABETES MELLITUS WITHOUT COMPLICATION: ICD-10-CM

## 2025-04-22 RX ORDER — BLOOD-GLUCOSE SENSOR
1 EACH MISCELLANEOUS
Qty: 3 EACH | Refills: 3 | Status: SHIPPED | OUTPATIENT
Start: 2025-04-22

## 2025-04-22 NOTE — TELEPHONE ENCOUNTER
Last OV:  9/27/24  Next OV:  Pt cancelled appt for 4/22 - will call back to reschedule.  Last A1C  9/27/24  5.6

## 2025-05-01 DIAGNOSIS — E11.9 TYPE 2 DIABETES MELLITUS TREATED WITHOUT INSULIN (MULTI): ICD-10-CM

## 2025-05-05 DIAGNOSIS — F32.89 OTHER DEPRESSION: ICD-10-CM

## 2025-05-07 RX ORDER — BUPROPION HYDROCHLORIDE 300 MG/1
300 TABLET ORAL EVERY MORNING
Qty: 90 TABLET | Refills: 1 | Status: SHIPPED | OUTPATIENT
Start: 2025-05-07 | End: 2025-11-03

## 2025-05-13 RX ORDER — INSULIN GLARGINE 100 [IU]/ML
100 INJECTION, SOLUTION SUBCUTANEOUS NIGHTLY
Qty: 30 ML | Refills: 0 | Status: SHIPPED | OUTPATIENT
Start: 2025-05-13

## 2025-06-09 ENCOUNTER — OFFICE VISIT (OUTPATIENT)
Dept: PRIMARY CARE | Facility: CLINIC | Age: 60
End: 2025-06-09
Payer: COMMERCIAL

## 2025-06-09 VITALS
BODY MASS INDEX: 32.05 KG/M2 | SYSTOLIC BLOOD PRESSURE: 150 MMHG | HEART RATE: 80 BPM | DIASTOLIC BLOOD PRESSURE: 72 MMHG | WEIGHT: 210.8 LBS

## 2025-06-09 DIAGNOSIS — G89.29 CHRONIC PAIN OF BOTH KNEES: ICD-10-CM

## 2025-06-09 DIAGNOSIS — M25.561 CHRONIC PAIN OF BOTH KNEES: ICD-10-CM

## 2025-06-09 DIAGNOSIS — N52.9 ERECTILE DYSFUNCTION, UNSPECIFIED ERECTILE DYSFUNCTION TYPE: ICD-10-CM

## 2025-06-09 DIAGNOSIS — I10 PRIMARY HYPERTENSION: ICD-10-CM

## 2025-06-09 DIAGNOSIS — Z79.4 TYPE 2 DIABETES MELLITUS WITH MILD NONPROLIFERATIVE RETINOPATHY WITHOUT MACULAR EDEMA, WITH LONG-TERM CURRENT USE OF INSULIN, UNSPECIFIED LATERALITY: Primary | ICD-10-CM

## 2025-06-09 DIAGNOSIS — R45.4 IRRITABILITY: ICD-10-CM

## 2025-06-09 DIAGNOSIS — E11.3299 TYPE 2 DIABETES MELLITUS WITH MILD NONPROLIFERATIVE RETINOPATHY WITHOUT MACULAR EDEMA, WITH LONG-TERM CURRENT USE OF INSULIN, UNSPECIFIED LATERALITY: Primary | ICD-10-CM

## 2025-06-09 DIAGNOSIS — M25.562 CHRONIC PAIN OF BOTH KNEES: ICD-10-CM

## 2025-06-09 DIAGNOSIS — R45.4 ANGER: ICD-10-CM

## 2025-06-09 PROBLEM — E11.9 TYPE 2 DIABETES MELLITUS: Status: ACTIVE | Noted: 2025-06-09

## 2025-06-09 PROBLEM — N23 RENAL COLIC: Status: ACTIVE | Noted: 2025-06-09

## 2025-06-09 PROBLEM — N17.9 ACUTE RENAL FAILURE: Status: ACTIVE | Noted: 2025-06-09

## 2025-06-09 LAB — POC HEMOGLOBIN A1C: 5.2 % (ref 4.2–6.5)

## 2025-06-09 PROCEDURE — 1036F TOBACCO NON-USER: CPT | Performed by: FAMILY MEDICINE

## 2025-06-09 PROCEDURE — 3077F SYST BP >= 140 MM HG: CPT | Performed by: FAMILY MEDICINE

## 2025-06-09 PROCEDURE — 4010F ACE/ARB THERAPY RXD/TAKEN: CPT | Performed by: FAMILY MEDICINE

## 2025-06-09 PROCEDURE — 99214 OFFICE O/P EST MOD 30 MIN: CPT | Performed by: FAMILY MEDICINE

## 2025-06-09 PROCEDURE — 3044F HG A1C LEVEL LT 7.0%: CPT | Performed by: FAMILY MEDICINE

## 2025-06-09 PROCEDURE — 3078F DIAST BP <80 MM HG: CPT | Performed by: FAMILY MEDICINE

## 2025-06-09 PROCEDURE — 83036 HEMOGLOBIN GLYCOSYLATED A1C: CPT | Performed by: FAMILY MEDICINE

## 2025-06-09 RX ORDER — FLUOXETINE 10 MG/1
10 CAPSULE ORAL DAILY
Qty: 30 CAPSULE | Refills: 0 | Status: SHIPPED | OUTPATIENT
Start: 2025-06-09 | End: 2025-08-08

## 2025-06-09 RX ORDER — ROSUVASTATIN CALCIUM 10 MG/1
1 TABLET, COATED ORAL DAILY
COMMUNITY

## 2025-06-09 RX ORDER — CELECOXIB 100 MG/1
100 CAPSULE ORAL 2 TIMES DAILY
COMMUNITY

## 2025-06-09 ASSESSMENT — PATIENT HEALTH QUESTIONNAIRE - PHQ9
2. FEELING DOWN, DEPRESSED OR HOPELESS: NOT AT ALL
1. LITTLE INTEREST OR PLEASURE IN DOING THINGS: NOT AT ALL
SUM OF ALL RESPONSES TO PHQ9 QUESTIONS 1 AND 2: 0

## 2025-06-09 ASSESSMENT — PAIN SCALES - GENERAL: PAINLEVEL_OUTOF10: 7

## 2025-06-09 NOTE — PATIENT INSTRUCTIONS
Follow your BP at home and call if > 140/90.     Will follow your sugars on the Dexcom and see what's happening there over the next 2 weeks as you're on the anxiety medicine.     Start the fluoxetine and let me know you're doing over the next 2 weeks.     Set up the functional capacity exam to evaluate for disability.

## 2025-06-09 NOTE — PROGRESS NOTES
Subjective   Patient ID: Kevin L Bosworth is a 59 y.o. male who presents for Diabetes.    HPI   He presents today for diabetic follow-up.  His hemoglobin A1c is 5.2, which surprised him because his sugars have been higher.  We downloaded his Dexcom, readings and his average blood sugar is 155 and he is starting to get higher in the evening.  Unfortunately, there is a look at this at his phone since it would not download into the computer.    He has not been checking his blood sugars at home as regularly lately.    He states he needs something for mood.  He states he is either crying all the time or angry and irritable.  States he does not sleep well.  Has had a lot of anhedonia, he has trouble motivating himself.  Energy is low.    He states he has seen a doctor for his knees who recommended injections and then potentially knee replacements after the MRI shows significant osteoarthritis.  However, he states his insurance will not cover it without doing physical therapy and pain management.    Review of Systems  No chest pain or shortness of breath.  No polyuria polydipsia.    Objective   /72   Pulse 80   Wt 95.6 kg (210 lb 12.8 oz)   BMI 32.05 kg/m²     Physical Exam  Constitutional:       Appearance: Normal appearance.   Neck:      Thyroid: No thyromegaly or thyroid tenderness.      Vascular: No carotid bruit.   Cardiovascular:      Rate and Rhythm: Normal rate and regular rhythm.      Heart sounds: No murmur heard.  Pulmonary:      Effort: Pulmonary effort is normal.      Breath sounds: Normal breath sounds.   Musculoskeletal:      Cervical back: Neck supple.   Neurological:      Mental Status: He is alert.   Psychiatric:         Mood and Affect: Mood normal.     Still with ED symptoms.  The fragment was no help.  States he thinks he tried Cialis 2 without much help at all.    Assessment/Plan   Diagnoses and all orders for this visit:  Type 2 diabetes mellitus with mild nonproliferative retinopathy  without macular edema, with long-term current use of insulin, unspecified laterality  -     POCT glycosylated hemoglobin (Hb A1C) manually resulted  Primary hypertension  Anger  Irritability  -     FLUoxetine (PROzac) 10 mg capsule; Take 1 capsule (10 mg) by mouth once daily.  Chronic pain of both knees  -     Referral to Occupational Therapy; Future  Erectile dysfunction, unspecified erectile dysfunction type  -     Referral to Urology; Future  Close hemoglobin A1c is good today, his readings have not been as good.  For now we will keep things as they are Kp He let me know if the sugars begin to elevate more.  He uses a sliding scale for the short acting insulin.  He will continue that.    Will add fluoxetine 10 mg and let me know how he is doing over the next 2 weeks with the irritability and anhedonia.  We can discuss that at follow-up.    As far as the knee pain goes and his wondering about disability, I recommend he schedule a functional assessment test and then go from there.    He will see urology for the ED.    I did tell him he certainly can schedule physical therapy and get that out of the way to see if that would help his orthopedist treat his issue.

## 2025-06-27 DIAGNOSIS — E11.9 TYPE 2 DIABETES MELLITUS TREATED WITHOUT INSULIN (MULTI): ICD-10-CM

## 2025-06-27 NOTE — TELEPHONE ENCOUNTER
LOV:  6/9/2025         NEXT OV:   NONE SCHEDULED                         LAST FILL:  5/13/2025 30 DAY SUPPLY                       LABS:    A1c 6/9/2025

## 2025-06-30 RX ORDER — INSULIN GLARGINE 100 [IU]/ML
100 INJECTION, SOLUTION SUBCUTANEOUS NIGHTLY
Qty: 90 ML | Refills: 0 | Status: SHIPPED | OUTPATIENT
Start: 2025-06-30

## 2025-07-01 DIAGNOSIS — K21.9 GASTROESOPHAGEAL REFLUX DISEASE WITHOUT ESOPHAGITIS: ICD-10-CM

## 2025-07-02 RX ORDER — PANTOPRAZOLE SODIUM 40 MG/1
40 TABLET, DELAYED RELEASE ORAL 2 TIMES DAILY
Qty: 180 TABLET | Refills: 1 | Status: SHIPPED | OUTPATIENT
Start: 2025-07-02 | End: 2025-12-29

## 2025-07-06 DIAGNOSIS — R45.4 IRRITABILITY: ICD-10-CM

## 2025-07-07 RX ORDER — FLUOXETINE 10 MG/1
10 CAPSULE ORAL DAILY
Qty: 90 CAPSULE | Refills: 1 | Status: SHIPPED | OUTPATIENT
Start: 2025-07-07

## 2025-07-17 DIAGNOSIS — E11.9 DIABETES MELLITUS WITHOUT COMPLICATION: ICD-10-CM

## 2025-07-17 RX ORDER — BLOOD-GLUCOSE SENSOR
EACH MISCELLANEOUS
COMMUNITY

## 2025-07-17 RX ORDER — BLOOD-GLUCOSE SENSOR
EACH MISCELLANEOUS
Status: CANCELLED | OUTPATIENT
Start: 2025-07-17

## 2025-07-18 RX ORDER — INSULIN LISPRO 100 [IU]/ML
20 INJECTION, SOLUTION INTRAVENOUS; SUBCUTANEOUS
Qty: 54 ML | Refills: 1 | Status: SHIPPED | OUTPATIENT
Start: 2025-07-18 | End: 2026-01-14

## 2025-07-21 ENCOUNTER — TELEMEDICINE (OUTPATIENT)
Dept: PRIMARY CARE | Facility: CLINIC | Age: 60
End: 2025-07-21
Payer: COMMERCIAL

## 2025-07-21 DIAGNOSIS — E11.3299 TYPE 2 DIABETES MELLITUS WITH MILD NONPROLIFERATIVE RETINOPATHY WITHOUT MACULAR EDEMA, WITH LONG-TERM CURRENT USE OF INSULIN, UNSPECIFIED LATERALITY: ICD-10-CM

## 2025-07-21 DIAGNOSIS — Z79.4 TYPE 2 DIABETES MELLITUS WITH MILD NONPROLIFERATIVE RETINOPATHY WITHOUT MACULAR EDEMA, WITH LONG-TERM CURRENT USE OF INSULIN, UNSPECIFIED LATERALITY: ICD-10-CM

## 2025-07-21 DIAGNOSIS — R45.4 IRRITABILITY: Primary | ICD-10-CM

## 2025-07-21 PROCEDURE — 99214 OFFICE O/P EST MOD 30 MIN: CPT | Performed by: FAMILY MEDICINE

## 2025-07-21 PROCEDURE — 1036F TOBACCO NON-USER: CPT | Performed by: FAMILY MEDICINE

## 2025-07-21 PROCEDURE — 4010F ACE/ARB THERAPY RXD/TAKEN: CPT | Performed by: FAMILY MEDICINE

## 2025-07-21 PROCEDURE — 3044F HG A1C LEVEL LT 7.0%: CPT | Performed by: FAMILY MEDICINE

## 2025-07-21 RX ORDER — BLOOD-GLUCOSE SENSOR
EACH MISCELLANEOUS
Qty: 3 EACH | Refills: 2 | Status: SHIPPED | OUTPATIENT
Start: 2025-07-21

## 2025-07-21 RX ORDER — FLUOXETINE 20 MG/1
20 CAPSULE ORAL DAILY
Qty: 30 CAPSULE | Refills: 1 | Status: SHIPPED | OUTPATIENT
Start: 2025-07-21

## 2025-07-21 ASSESSMENT — PATIENT HEALTH QUESTIONNAIRE - PHQ9
1. LITTLE INTEREST OR PLEASURE IN DOING THINGS: NOT AT ALL
SUM OF ALL RESPONSES TO PHQ9 QUESTIONS 1 AND 2: 0
2. FEELING DOWN, DEPRESSED OR HOPELESS: NOT AT ALL

## 2025-07-21 NOTE — PROGRESS NOTES
Answers submitted by the patient for this visit:  Diabetes Questionnaire (Submitted on 7/21/2025)  Chief Complaint: Diabetes problem  Diabetes type: type 1  MedicAlert ID: No  Disease duration: 46 Years

## 2025-07-21 NOTE — PROGRESS NOTES
Subjective   Patient ID: Kevin L Bosworth is a 60 y.o. male who presents for Diabetes.    Diabetes  He has type 1 diabetes mellitus. No MedicAlert identification noted. The initial diagnosis of diabetes was made 46 years ago.      He presents today to follow-up on his response to the fluoxetine.  He states the irritability is somewhat better.  He is having some side effects related to this including some sexual side effects and his appetite is lowered.  However, since he has been on this is blood sugars have been dropping.  At this time he is not on the Humalog at all because his sugars were getting too low.  He feels he might do better on a higher dose of the fluoxetine just the take the complete edge off.  He has not had his Dexcom recently since he ran out of the prescription.    Review of Systems    Objective   There were no vitals taken for this visit.    Physical Exam  He is alert, no parishes, his affect is very pleasant.  He sitting down at home.    Assessment/Plan   Diagnoses and all orders for this visit:  Irritability  -     FLUoxetine (PROzac) 20 mg capsule; Take 1 capsule (20 mg) by mouth once daily.  Type 2 diabetes mellitus with mild nonproliferative retinopathy without macular edema, with long-term current use of insulin, unspecified laterality  -     Dexcom G7 Sensor device; Apply and change every 10 days.  Will increase to 20 mg.  Side effects are bad enough that he does not feel to stop and I told her that these a lot of times will decrease as he is on the medication more.  Will check back in 3 to 4 weeks and see how he is doing at this dosing.  For the diabetes, he is going to fill the prescription for the Dexcom and wear that.  After he is been out for a week to 10 days, he will let me know and we will download it to see how he is doing off of the Humalog.    Virtual or Telephone Consent    An interactive audio and video telecommunication system which permits real time communications between the  patient (at the originating site) and provider (at the distant site) was utilized to provide this telehealth service.   Verbal consent was requested and obtained from Kevin L Bosworth on this date, 07/21/25 for a telehealth visit and the patient's location was confirmed at the time of the visit.   Visit was done using the [employer] software since I came to the appointment in our late and he was no longer on the epic software.

## 2025-08-13 DIAGNOSIS — R45.4 IRRITABILITY: ICD-10-CM

## 2025-08-14 RX ORDER — FLUOXETINE 20 MG/1
20 CAPSULE ORAL DAILY
Qty: 90 CAPSULE | Refills: 1 | Status: SHIPPED | OUTPATIENT
Start: 2025-08-14